# Patient Record
Sex: FEMALE | HISPANIC OR LATINO | URBAN - METROPOLITAN AREA
[De-identification: names, ages, dates, MRNs, and addresses within clinical notes are randomized per-mention and may not be internally consistent; named-entity substitution may affect disease eponyms.]

---

## 2020-07-28 ENCOUNTER — TRANSCRIBE ORDERS (OUTPATIENT)
Dept: ADMINISTRATIVE | Facility: HOSPITAL | Age: 45
End: 2020-07-28

## 2020-07-28 DIAGNOSIS — D50.9 IRON DEFICIENCY ANEMIA, UNSPECIFIED IRON DEFICIENCY ANEMIA TYPE: Primary | ICD-10-CM

## 2021-10-15 ENCOUNTER — OFFICE VISIT (OUTPATIENT)
Dept: DENTISTRY | Facility: CLINIC | Age: 46
End: 2021-10-15

## 2021-10-15 VITALS — SYSTOLIC BLOOD PRESSURE: 110 MMHG | HEART RATE: 73 BPM | DIASTOLIC BLOOD PRESSURE: 71 MMHG | TEMPERATURE: 98.3 F

## 2021-10-15 DIAGNOSIS — Z01.21 ENCOUNTER FOR DENTAL EXAMINATION AND CLEANING WITH ABNORMAL FINDINGS: Primary | ICD-10-CM

## 2021-10-15 PROCEDURE — D0210 INTRAORAL - COMPLETE SERIES OF RADIOGRAPHIC IMAGES: HCPCS | Performed by: DENTIST

## 2021-10-15 PROCEDURE — D0150 COMPREHENSIVE ORAL EVALUATION - NEW OR ESTABLISHED PATIENT: HCPCS | Performed by: DENTIST

## 2022-06-08 ENCOUNTER — OFFICE VISIT (OUTPATIENT)
Dept: DENTISTRY | Facility: CLINIC | Age: 47
End: 2022-06-08

## 2022-06-08 VITALS — DIASTOLIC BLOOD PRESSURE: 63 MMHG | HEART RATE: 61 BPM | SYSTOLIC BLOOD PRESSURE: 114 MMHG

## 2022-06-08 DIAGNOSIS — K08.3 RETAINED DENTAL ROOT: Primary | ICD-10-CM

## 2022-06-08 PROCEDURE — D7140 EXTRACTION, ERUPTED TOOTH OR EXPOSED ROOT (ELEVATION AND/OR FORCEPS REMOVAL): HCPCS | Performed by: DENTIST

## 2022-06-08 NOTE — PROGRESS NOTES
Oral Surgery    Sheela Avila presents for Ext #5    [address], patient denies any changes  Obtained a direct and personal consent  Risks and complications were explained  Pt agreed and consented  Consent scanned in doc center  Pre-Op BP WNL  Administered 1 carpule of 2 % Lidocaine w/ 1:100,000 epi via infiltration and 1/2 carpule 4% septocaine 1:100k epi infiltrations  Adequate anesthesia obtained, reflected gingiva, elevated, and extracted #5 without complications   Hemostasis achieved  Upon dismissal, patient received POI, gauze  Pt has analgesic at home  Recommended to return for the full debridement  Pt left satisfied and in good health  NV: full debridement    ABEBA Hartmann

## 2022-06-15 ENCOUNTER — OFFICE VISIT (OUTPATIENT)
Dept: DENTISTRY | Facility: CLINIC | Age: 47
End: 2022-06-15

## 2022-06-15 VITALS — DIASTOLIC BLOOD PRESSURE: 67 MMHG | TEMPERATURE: 97.9 F | HEART RATE: 64 BPM | SYSTOLIC BLOOD PRESSURE: 101 MMHG

## 2022-06-15 DIAGNOSIS — M27.3 DRY TOOTH SOCKET: ICD-10-CM

## 2022-06-15 DIAGNOSIS — K05.4 PERIODONTOSIS: Primary | ICD-10-CM

## 2022-06-15 PROCEDURE — D4355 FULL MOUTH DEBRIDEMENT TO ENABLE A COMPREHENSIVE ORAL EVALUATION AND DIAGNOSIS ON A SUBSEQUENT VISIT: HCPCS

## 2022-06-15 PROCEDURE — D0220 INTRAORAL - PERIAPICAL FIRST RADIOGRAPHIC IMAGE: HCPCS

## 2022-06-15 RX ORDER — AMOXICILLIN 500 MG/1
500 CAPSULE ORAL EVERY 8 HOURS SCHEDULED
Qty: 21 CAPSULE | Refills: 0 | Status: SHIPPED | OUTPATIENT
Start: 2022-06-15 | End: 2022-06-22

## 2022-06-15 NOTE — PROGRESS NOTES
perio debridement , 1 pa #5 area  REVIEWED MED HX: meds, allergies, health changes reviewed in EPIC  CHIEF CONCERN:  Pt reporting pain from #5 extraction few days ago  PAIN SCALE:  0  ASA CLASS:  I  PLAQUE:   moderate  CALCULUS: heavy accumulation   BLEEDING:   heavy  STAIN :  light   ORAL HYGIENE:   poor     Gross cavitron completed  Dr Lynsey Quinones evaluated UR area  Appears dry socket present  Dr Lynsey Quinones placed will RX antibiotic  1 carp Lidocaine 2%  Cleaned socket  Placed dry socket paste  Advised pt to bite on gauze 30 min and rx'd amoxicillin 500 mg  Visual and Tactile Intraoral/ Extraoral evaluation: Oral and Oropharyngeal cancer evaluation   No findings     REFERRALS: no referrals needed    Next Visit: appt with  for comp exam/ finalize tx plan/ fillings- evaluate for sc/rp    Last FMX: 10/15/21

## 2022-09-12 ENCOUNTER — OFFICE VISIT (OUTPATIENT)
Dept: DENTISTRY | Facility: CLINIC | Age: 47
End: 2022-09-12

## 2022-09-12 VITALS — SYSTOLIC BLOOD PRESSURE: 123 MMHG | HEART RATE: 73 BPM | DIASTOLIC BLOOD PRESSURE: 80 MMHG

## 2022-09-12 DIAGNOSIS — Z01.20 ENCOUNTER FOR DENTAL EXAMINATION AND CLEANING WITHOUT ABNORMAL FINDINGS: Primary | ICD-10-CM

## 2022-09-12 PROCEDURE — D0191 ASSESSMENT OF A PATIENT: HCPCS | Performed by: DENTIST

## 2022-09-12 NOTE — PROGRESS NOTES
Chang Mancera presents for treatment planning after debridment  Verbal consent for treatment given in addition to the forms   used  Reviewed health history - Patient is ASA I  Consents signed: Yes    Perio: probing completed, prophy reccommended   Pain Scale: 3, some sensitivity  Caries Assessment: medium  Radiographs: none taken today, previous fmx from 10/21     Oral Hygiene instruction reviewed and given  Hygiene recall visits recommended to the patient  EO: wnl  IO: CL 2 occlusion, OB 90%, OJ 9mm  Caries: #4-MO, #13-MOD, #15-O, #17-O, #18-O, #19-OB, #20-O    Treatment Plan:  1  Periodontal therapy: prophy  2  Caries control  3  Occlusal evaluation: Discussed implants for missing teeth in area of #3,5,12,14  Implant consult to be done at [facility] with dr lau     Prognosis is Good      Next visit: prophy  NVV: implant consult [facility] with dr Ayleen Rahman

## 2022-12-01 ENCOUNTER — OFFICE VISIT (OUTPATIENT)
Dept: DENTISTRY | Facility: CLINIC | Age: 47
End: 2022-12-01

## 2022-12-01 VITALS — DIASTOLIC BLOOD PRESSURE: 60 MMHG | TEMPERATURE: 95.8 F | SYSTOLIC BLOOD PRESSURE: 115 MMHG | HEART RATE: 65 BPM

## 2022-12-01 DIAGNOSIS — Z01.20 ENCOUNTER FOR DENTAL EXAMINATION AND CLEANING WITHOUT ABNORMAL FINDINGS: Primary | ICD-10-CM

## 2022-12-01 NOTE — PROGRESS NOTES
Patient examined today for implant consult, upon exam patient has 100% deep bite and no interocclusal space for crowns  Patient would need ortho first to align teeth and create space before implants could be discussed  Referral given to patient for ortho       NV: nelly at [facility]

## 2023-02-07 DIAGNOSIS — Z78.9 NEED FOR FOLLOW-UP BY SOCIAL WORKER: Primary | ICD-10-CM

## 2023-02-08 ENCOUNTER — PATIENT OUTREACH (OUTPATIENT)
Dept: FAMILY MEDICINE CLINIC | Facility: CLINIC | Age: 48
End: 2023-02-08
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
## 2023-04-06 ENCOUNTER — PATIENT OUTREACH (OUTPATIENT)
Dept: PEDIATRICS CLINIC | Facility: CLINIC | Age: 48
End: 2023-04-06
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
## 2023-05-27 ENCOUNTER — HOSPITAL ENCOUNTER (EMERGENCY)
Facility: HOSPITAL | Age: 48
Discharge: HOME | End: 2023-05-27
Attending: EMERGENCY MEDICINE | Admitting: EMERGENCY MEDICINE

## 2023-05-27 VITALS
TEMPERATURE: 97.6 F | OXYGEN SATURATION: 99 % | RESPIRATION RATE: 16 BRPM | SYSTOLIC BLOOD PRESSURE: 116 MMHG | HEART RATE: 74 BPM | DIASTOLIC BLOOD PRESSURE: 54 MMHG

## 2023-05-27 DIAGNOSIS — R10.9 ABDOMINAL PAIN, UNSPECIFIED ABDOMINAL LOCATION: Primary | ICD-10-CM

## 2023-05-27 LAB
ALBUMIN SERPL BCP-MCNC: 3.8 G/DL (ref 3.5–5)
ALP SERPL-CCNC: 194 U/L (ref 34–104)
ALT SERPL W P-5'-P-CCNC: 19 U/L (ref 7–52)
ANION GAP SERPL CALCULATED.3IONS-SCNC: 9 MMOL/L (ref 4–13)
AST SERPL W P-5'-P-CCNC: 13 U/L (ref 13–39)
BASOPHILS # BLD AUTO: 0.01 THOUSANDS/ÂΜL (ref 0–0.1)
BASOPHILS NFR BLD AUTO: 0 % (ref 0–1)
BILIRUB SERPL-MCNC: 0.69 MG/DL (ref 0.2–1)
BUN SERPL-MCNC: 15 MG/DL (ref 5–25)
CALCIUM SERPL-MCNC: 9.1 MG/DL (ref 8.4–10.2)
CHLORIDE SERPL-SCNC: 102 MMOL/L (ref 96–108)
CO2 SERPL-SCNC: 24 MMOL/L (ref 21–32)
CREAT SERPL-MCNC: 0.54 MG/DL (ref 0.6–1.3)
EOSINOPHIL # BLD AUTO: 0.14 THOUSAND/ÂΜL (ref 0–0.61)
EOSINOPHIL NFR BLD AUTO: 3 % (ref 0–6)
ERYTHROCYTE [DISTWIDTH] IN BLOOD BY AUTOMATED COUNT: 12.2 % (ref 11.6–15.1)
EXT PREGNANCY TEST URINE: NEGATIVE
EXT. CONTROL: NORMAL
GFR SERPL CREATININE-BSD FRML MDRD: 111 ML/MIN/1.73SQ M
GLUCOSE SERPL-MCNC: 323 MG/DL (ref 65–140)
HCT VFR BLD AUTO: 40.3 % (ref 34.8–46.1)
HGB BLD-MCNC: 14.3 G/DL (ref 11.5–15.4)
IMM GRANULOCYTES # BLD AUTO: 0.01 THOUSAND/UL (ref 0–0.2)
IMM GRANULOCYTES NFR BLD AUTO: 0 % (ref 0–2)
LIPASE SERPL-CCNC: 43 U/L (ref 11–82)
LYMPHOCYTES # BLD AUTO: 1.59 THOUSANDS/ÂΜL (ref 0.6–4.47)
LYMPHOCYTES NFR BLD AUTO: 33 % (ref 14–44)
MCH RBC QN AUTO: 30 PG (ref 26.8–34.3)
MCHC RBC AUTO-ENTMCNC: 35.5 G/DL (ref 31.4–37.4)
MCV RBC AUTO: 85 FL (ref 82–98)
MONOCYTES # BLD AUTO: 0.39 THOUSAND/ÂΜL (ref 0.17–1.22)
MONOCYTES NFR BLD AUTO: 8 % (ref 4–12)
NEUTROPHILS # BLD AUTO: 2.62 THOUSANDS/ÂΜL (ref 1.85–7.62)
NEUTS SEG NFR BLD AUTO: 56 % (ref 43–75)
NRBC BLD AUTO-RTO: 0 /100 WBCS
PLATELET # BLD AUTO: 261 THOUSANDS/UL (ref 149–390)
PMV BLD AUTO: 9.9 FL (ref 8.9–12.7)
POTASSIUM SERPL-SCNC: 3.9 MMOL/L (ref 3.5–5.3)
PROT SERPL-MCNC: 6.5 G/DL (ref 6.4–8.4)
RBC # BLD AUTO: 4.77 MILLION/UL (ref 3.81–5.12)
SODIUM SERPL-SCNC: 135 MMOL/L (ref 135–147)
WBC # BLD AUTO: 4.76 THOUSAND/UL (ref 4.31–10.16)

## 2023-05-27 RX ORDER — MAGNESIUM HYDROXIDE/ALUMINUM HYDROXICE/SIMETHICONE 120; 1200; 1200 MG/30ML; MG/30ML; MG/30ML
30 SUSPENSION ORAL ONCE
Status: COMPLETED | OUTPATIENT
Start: 2023-05-27 | End: 2023-05-27

## 2023-05-27 RX ORDER — FAMOTIDINE 20 MG/1
20 TABLET, FILM COATED ORAL ONCE
Status: COMPLETED | OUTPATIENT
Start: 2023-05-27 | End: 2023-05-27

## 2023-05-27 RX ORDER — LANSOPRAZOLE 30 MG/1
30 CAPSULE, DELAYED RELEASE ORAL DAILY
Qty: 14 CAPSULE | Refills: 0 | Status: SHIPPED | OUTPATIENT
Start: 2023-05-27 | End: 2023-06-10

## 2023-05-27 RX ORDER — FAMOTIDINE 20 MG/1
20 TABLET, FILM COATED ORAL 2 TIMES DAILY
Qty: 30 TABLET | Refills: 0 | Status: SHIPPED | OUTPATIENT
Start: 2023-05-27

## 2023-05-27 RX ADMIN — ALUMINUM HYDROXIDE, MAGNESIUM HYDROXIDE, AND SIMETHICONE 30 ML: 200; 200; 20 SUSPENSION ORAL at 22:16

## 2023-05-27 RX ADMIN — FAMOTIDINE 20 MG: 20 TABLET, FILM COATED ORAL at 22:15

## 2023-05-28 NOTE — ED PROVIDER NOTES
History  Chief Complaint   Patient presents with   • Abdominal Pain     Patient arrives to the emergency department with complain of abdominal pain starting two weeks ago  The pain is periumbilical and radiates to the right flank  She states the pain starts when she get hungry  She states that she had been taking naproxen 4 times a day for the pain, however she has not taken her medication since Thursday due to a family crisis  69-year-old female, presents with abdominal pain  Reports pain in periumbilical and upper abdomen that has been present for 2 weeks  Patient states pain is worse when she gets hungry before eating  Has had several episodes of nausea and vomiting, last several days ago  Denies any fever, denies any history of abdominal surgery  Patient states that she has been taking naproxen frequently for pain  History provided by:  Patient   used: Yes    Abdominal Pain  Associated symptoms: nausea    Associated symptoms: no fever        Prior to Admission Medications   Prescriptions Last Dose Informant Patient Reported? Taking?   acetaminophen (TYLENOL) 500 mg tablet   No No   Sig: Take 2 tablets (1,000 mg total) by mouth every 6 (six) hours as needed for mild pain for up to 20 doses   fluticasone (FLONASE) 50 mcg/act nasal spray   No No   Si sprays into each nostril daily   naproxen (Naprosyn) 500 mg tablet   No No   Sig: Take 1 tablet (500 mg total) by mouth 2 (two) times a day with meals      Facility-Administered Medications: None       Past Medical History:   Diagnosis Date   • Diabetes mellitus ([facility] [address] )        History reviewed  No pertinent surgical history  History reviewed  No pertinent family history  I have reviewed and agree with the history as documented      E-Cigarette/Vaping   • E-Cigarette Use Never User      E-Cigarette/Vaping Substances     Social History     Tobacco Use   • Smoking status: Never   • Smokeless tobacco: Never   Vaping Use   • Vaping Use: Never used   Substance Use Topics   • Alcohol use: Yes     Comment: socially   • Drug use: Never       Review of Systems   Constitutional: Negative  Negative for fever  Respiratory: Negative  Cardiovascular: Negative  Gastrointestinal: Positive for abdominal pain and nausea  Neurological: Negative  Physical Exam  Physical Exam  Vitals and nursing note reviewed  Constitutional:       General: She is not in acute distress  HENT:      Head: Normocephalic and atraumatic  Cardiovascular:      Rate and Rhythm: Normal rate and regular rhythm  Pulmonary:      Effort: Pulmonary effort is normal       Breath sounds: Normal breath sounds  Abdominal:      Comments: Nondistended, soft  Mild tenderness to epigastric abdomen, no rebound or guarding  No right upper quadrant tenderness, negative Fernandez sign, no right lower quadrant tenderness  Skin:     General: Skin is warm and dry  Neurological:      General: No focal deficit present  Mental Status: She is alert and oriented to person, place, and time           Vital Signs  ED Triage Vitals [05/27/23 2158]   Temperature Pulse Respirations Blood Pressure SpO2   97 6 °F (36 4 °C) 74 16 116/54 99 %      Temp Source Heart Rate Source Patient Position - Orthostatic VS BP Location FiO2 (%)   Oral Monitor Lying Right arm --      Pain Score       --           Vitals:    05/27/23 2158   BP: 116/54   Pulse: 74   Patient Position - Orthostatic VS: Lying         Visual Acuity      ED Medications  Medications   aluminum-magnesium hydroxide-simethicone (MYLANTA) oral suspension 30 mL (30 mL Oral Given 5/27/23 2216)   famotidine (PEPCID) tablet 20 mg (20 mg Oral Given 5/27/23 2215)       Diagnostic Studies  Results Reviewed     Procedure Component Value Units Date/Time    Comprehensive metabolic panel [[id number]]  (Abnormal) Collected: 05/27/23 2223    Lab Status: Final result Specimen: Blood from Arm, Right Updated: 05/27/23 2249     Sodium 135 mmol/L Potassium 3 9 mmol/L      Chloride 102 mmol/L      CO2 24 mmol/L      ANION GAP 9 mmol/L      BUN 15 mg/dL      Creatinine 0 54 mg/dL      Glucose 323 mg/dL      Calcium 9 1 mg/dL      AST 13 U/L      ALT 19 U/L      Alkaline Phosphatase 194 U/L      Total Protein 6 5 g/dL      Albumin 3 8 g/dL      Total Bilirubin 0 69 mg/dL      eGFR 111 ml/min/1 73sq m     Narrative:      National Kidney Disease Foundation guidelines for Chronic Kidney Disease (CKD):   •  Stage 1 with normal or high GFR (GFR > 90 mL/min/1 73 square meters)  •  Stage 2 Mild CKD (GFR = 60-89 mL/min/1 73 square meters)  •  Stage 3A Moderate CKD (GFR = 45-59 mL/min/1 73 square meters)  •  Stage 3B Moderate CKD (GFR = 30-44 mL/min/1 73 square meters)  •  Stage 4 Severe CKD (GFR = 15-29 mL/min/1 73 square meters)  •  Stage 5 End Stage CKD (GFR <15 mL/min/1 73 square meters)  Note: GFR calculation is accurate only with a steady state creatinine    Lipase [[id number]]  (Normal) Collected: 05/27/23 2223    Lab Status: Final result Specimen: Blood from Arm, Right Updated: 05/27/23 2249     Lipase 43 u/L     CBC and differential [[id number]] Collected: 05/27/23 2223    Lab Status: Final result Specimen: Blood from Arm, Right Updated: 05/27/23 2230     WBC 4 76 Thousand/uL      RBC 4 77 Million/uL      Hemoglobin 14 3 g/dL      Hematocrit 40 3 %      MCV 85 fL      MCH 30 0 pg      MCHC 35 5 g/dL      RDW 12 2 %      MPV 9 9 fL      Platelets 050 Thousands/uL      nRBC 0 /100 WBCs      Neutrophils Relative 56 %      Immat GRANS % 0 %      Lymphocytes Relative 33 %      Monocytes Relative 8 %      Eosinophils Relative 3 %      Basophils Relative 0 %      Neutrophils Absolute 2 62 Thousands/µL      Immature Grans Absolute 0 01 Thousand/uL      Lymphocytes Absolute 1 59 Thousands/µL      Monocytes Absolute 0 39 Thousand/µL      Eosinophils Absolute 0 14 Thousand/µL      Basophils Absolute 0 01 Thousands/µL     POCT pregnancy, urine [[id number]]  (Normal) Resulted: 05/27/23 2210    Lab Status: Final result Updated: 05/27/23 2216     EXT Preg Test, Ur Negative     Control Valid                 No orders to display              Procedures  Procedures         ED Course  ED Course as of 05/27/23 2308   Sat May 27, 2023   2305 Patient comfortable, reports improvement in pain after receiving medication, tolerating oral intake in ED  Abdomen soft and nontender on repeat exam   Lab results reviewed and discussed with patient  Glucose noted to be elevated, patient has history of diabetes, no other acute abnormalities  2307 Discussed with patient through  to stop taking any anti-inflammatory medication as this is likely contributing to her abdominal pain  We will start patient on antacid H2 blocker and PPI  Discussed need to follow-up with primary doctor and GI for further evaluation, follow-up or return for any worsening or new concerning symptoms  SBIRT 20yo+    Flowsheet Row Most Recent Value   Initial Alcohol Screen: US AUDIT-C     1  How often do you have a drink containing alcohol? 0 Filed at: 05/27/2023 2202   2  How many drinks containing alcohol do you have on a typical day you are drinking? 0 Filed at: 05/27/2023 2202   3b  FEMALE Any Age, or MALE 65+: How often do you have 4 or more drinks on one occassion? 0 Filed at: 05/27/2023 2202   Audit-C Score 0 Filed at: 05/27/2023 2202   ANDREZ: How many times in the past year have you    Used an illegal drug or used a prescription medication for non-medical reasons? Never Filed at: 05/27/2023 2202                    Medical Decision Making  26-year-old female, presenting with abdominal pain  Differential diagnosis includes gastritis, pancreatitis, cholecystitis, appendicitis among other diagnoses  On exam, patient looks well, has mild epigastric tenderness, no right upper quadrant or right lower quadrant tenderness  Patient reports taking multiple doses of naproxen daily  Labs ordered, will give medication, monitor and reevaluate  I have reviewed test results and diagnosis with patient  Follow-up plan reviewed  Precautions for acute return for re-evaluation are reviewed  Opportunity to ask questions was provided  Patient verbalizes understanding  Amount and/or Complexity of Data Reviewed  Labs: ordered  Decision-making details documented in ED Course  Risk  OTC drugs  Prescription drug management  Disposition  Final diagnoses:   Abdominal pain, unspecified abdominal location     Time reflects when diagnosis was documented in both MDM as applicable and the Disposition within this note     Time User Action Codes Description Comment    5/27/2023 11:03 PM Edgarnikki OrtizMisa [address]  [R10 9] Abdominal pain, unspecified abdominal location       ED Disposition     ED Disposition   Discharge    Condition   Stable    Date/Time   Sat May 27, 2023 11:03 PM    [address] discharge to home/self care  Follow-up Information     Follow up With Specialties Details Why Contact Info Additional [address] Danuta Martel, DO Internal Medicine        [facility]   [address] [place] [address] [phone]  Radha Ventura [address] [facility], [address] [place] [address], [address], [place], [place], [id number] [phone]          Patient's Medications   Discharge Prescriptions    FAMOTIDINE (PEPCID) 20 MG TABLET    Take 1 tablet (20 mg total) by mouth 2 (two) times a day       Start Date: 5/27/2023 End Date: --       Order Dose: 20 mg       Quantity: 30 tablet    Refills: 0    LANSOPRAZOLE (PREVACID) 30 MG CAPSULE    Take 1 capsule (30 mg total) by mouth daily for 14 days       Start Date: 5/27/2023 End Date: 6/10/2023       Order Dose: 30 mg       Quantity: 14 capsule    Refills: 0       No discharge procedures on file      PDMP Review     None          ED Provider  Electronically Signed by           Shaan Rios MD  05/27/23 3978

## 2023-05-28 NOTE — ED NOTES
Discharge reviewed with patient  Patient verbalized understanding and has no further questions at this time  Patient ambulatory off unit with steady gait        Devin Choe, [address] [facility]  05/27/23 5814

## 2023-05-28 NOTE — DISCHARGE INSTRUCTIONS
As discussed, do not take any anti-inflammatory medication including naproxen, ibuprofen, Aleve, Advil, or Motrin

## 2023-06-07 ENCOUNTER — OFFICE VISIT (OUTPATIENT)
Dept: DENTISTRY | Facility: CLINIC | Age: 48
End: 2023-06-07

## 2023-06-07 VITALS — HEART RATE: 97 BPM | DIASTOLIC BLOOD PRESSURE: 76 MMHG | SYSTOLIC BLOOD PRESSURE: 123 MMHG

## 2023-06-07 DIAGNOSIS — K02.9 CARIES: Primary | ICD-10-CM

## 2023-06-07 PROCEDURE — D2391 RESIN-BASED COMPOSITE - 1 SURFACE, POSTERIOR: HCPCS

## 2023-06-07 PROCEDURE — D2393 RESIN-BASED COMPOSITE - 3 SURFACES, POSTERIOR: HCPCS

## 2023-06-07 NOTE — PROGRESS NOTES
Pt presents to the clinic for a composite restoration on #13 and 20  H&P updated and vitals recorded  ASA: II   Pain: 0/10     Oral Cancer Screening Completed  WNL      20% Benzocaine topical LA applied to the injection site  Administered 1 5 carp 4% articaine 1:100k epi via buccal and lingual infiltration  Discussed with patient need for RCT if pulp exposure occurs or in future if pulp is inflamed  Pt understands and consents  Prepped teeth #13 and 20 with 245 carbide on high speed  Caries/ Existing restoration removed with round carbide on slow speed  Isolation with cotton rolls and dri-angles  Selectively etched enamel with 37% H2PO4, rinse, dry  Applied Adhese with 20 second scrub once, gentle air dry and light cured for 10s  Restored with flowable and Tetric bulk ciara shade A3 n light-cured increments  Refined with finishing burs, polished with enhance point  Verified occlusion and contacts  Pt left satisfied  Post op instructions given       NV: resins

## 2024-04-26 ENCOUNTER — HOSPITAL ENCOUNTER (EMERGENCY)
Facility: HOSPITAL | Age: 49
Discharge: HOME | End: 2024-04-27
Attending: EMERGENCY MEDICINE

## 2024-04-26 VITALS
SYSTOLIC BLOOD PRESSURE: 130 MMHG | DIASTOLIC BLOOD PRESSURE: 82 MMHG | TEMPERATURE: 98.4 F | BODY MASS INDEX: 31.41 KG/M2 | RESPIRATION RATE: 18 BRPM | WEIGHT: 167.33 LBS | OXYGEN SATURATION: 99 % | HEART RATE: 78 BPM

## 2024-04-26 DIAGNOSIS — Z87.19 HISTORY OF BLOODY STOOLS: ICD-10-CM

## 2024-04-26 DIAGNOSIS — N39.0 ACUTE UTI: ICD-10-CM

## 2024-04-26 DIAGNOSIS — R73.9 HYPERGLYCEMIA: ICD-10-CM

## 2024-04-26 DIAGNOSIS — B37.2 YEAST INFECTION OF THE SKIN: ICD-10-CM

## 2024-04-26 PROCEDURE — 87186 SC STD MICRODIL/AGAR DIL: CPT | Performed by: EMERGENCY MEDICINE

## 2024-04-26 PROCEDURE — 99283 EMERGENCY DEPT VISIT LOW MDM: CPT

## 2024-04-26 PROCEDURE — 81001 URINALYSIS AUTO W/SCOPE: CPT | Performed by: EMERGENCY MEDICINE

## 2024-04-26 PROCEDURE — 87086 URINE CULTURE/COLONY COUNT: CPT | Performed by: EMERGENCY MEDICINE

## 2024-04-26 PROCEDURE — 81025 URINE PREGNANCY TEST: CPT | Performed by: EMERGENCY MEDICINE

## 2024-04-26 PROCEDURE — 87077 CULTURE AEROBIC IDENTIFY: CPT | Performed by: EMERGENCY MEDICINE

## 2024-04-27 LAB
ALBUMIN SERPL BCP-MCNC: 4.1 G/DL (ref 3.5–5)
ALP SERPL-CCNC: 278 U/L (ref 34–104)
ALT SERPL W P-5'-P-CCNC: 24 U/L (ref 7–52)
ANION GAP SERPL CALCULATED.3IONS-SCNC: 10 MMOL/L (ref 4–13)
APTT PPP: 27 SECONDS (ref 23–37)
AST SERPL W P-5'-P-CCNC: 15 U/L (ref 13–39)
BACTERIA UR QL AUTO: ABNORMAL /HPF
BASOPHILS # BLD AUTO: 0.01 THOUSANDS/ÂΜL (ref 0–0.1)
BASOPHILS NFR BLD AUTO: 0 % (ref 0–1)
BILIRUB SERPL-MCNC: 0.75 MG/DL (ref 0.2–1)
BILIRUB UR QL STRIP: NEGATIVE
BUN SERPL-MCNC: 15 MG/DL (ref 5–25)
CALCIUM SERPL-MCNC: 9.5 MG/DL (ref 8.4–10.2)
CHLORIDE SERPL-SCNC: 99 MMOL/L (ref 96–108)
CLARITY UR: ABNORMAL
CO2 SERPL-SCNC: 25 MMOL/L (ref 21–32)
COLOR UR: YELLOW
CREAT SERPL-MCNC: 0.57 MG/DL (ref 0.6–1.3)
EOSINOPHIL # BLD AUTO: 0.09 THOUSAND/ÂΜL (ref 0–0.61)
EOSINOPHIL NFR BLD AUTO: 1 % (ref 0–6)
ERYTHROCYTE [DISTWIDTH] IN BLOOD BY AUTOMATED COUNT: 12 % (ref 11.6–15.1)
EXT PREGNANCY TEST URINE: NEGATIVE
EXT. CONTROL: NORMAL
GFR SERPL CREATININE-BSD FRML MDRD: 108 ML/MIN/1.73SQ M
GLUCOSE SERPL-MCNC: 370 MG/DL (ref 65–140)
GLUCOSE UR STRIP-MCNC: ABNORMAL MG/DL
HCT VFR BLD AUTO: 40.7 % (ref 34.8–46.1)
HEMOCCULT STL QL IA: POSITIVE
HGB BLD-MCNC: 14.5 G/DL (ref 11.5–15.4)
HGB UR QL STRIP.AUTO: ABNORMAL
IMM GRANULOCYTES # BLD AUTO: 0.02 THOUSAND/UL (ref 0–0.2)
IMM GRANULOCYTES NFR BLD AUTO: 0 % (ref 0–2)
INR PPP: 0.87 (ref 0.84–1.19)
KETONES UR STRIP-MCNC: NEGATIVE MG/DL
LEUKOCYTE ESTERASE UR QL STRIP: ABNORMAL
LIPASE SERPL-CCNC: 36 U/L (ref 11–82)
LYMPHOCYTES # BLD AUTO: 1.75 THOUSANDS/ÂΜL (ref 0.6–4.47)
LYMPHOCYTES NFR BLD AUTO: 27 % (ref 14–44)
MAGNESIUM SERPL-MCNC: 1.9 MG/DL (ref 1.9–2.7)
MCH RBC QN AUTO: 30 PG (ref 26.8–34.3)
MCHC RBC AUTO-ENTMCNC: 35.6 G/DL (ref 31.4–37.4)
MCV RBC AUTO: 84 FL (ref 82–98)
MONOCYTES # BLD AUTO: 0.49 THOUSAND/ÂΜL (ref 0.17–1.22)
MONOCYTES NFR BLD AUTO: 7 % (ref 4–12)
NEUTROPHILS # BLD AUTO: 4.23 THOUSANDS/ÂΜL (ref 1.85–7.62)
NEUTS SEG NFR BLD AUTO: 65 % (ref 43–75)
NITRITE UR QL STRIP: NEGATIVE
NON-SQ EPI CELLS URNS QL MICRO: ABNORMAL /HPF
NRBC BLD AUTO-RTO: 0 /100 WBCS
PH UR STRIP.AUTO: 6 [PH]
PLATELET # BLD AUTO: 234 THOUSANDS/UL (ref 149–390)
PMV BLD AUTO: 10.6 FL (ref 8.9–12.7)
POTASSIUM SERPL-SCNC: 3.6 MMOL/L (ref 3.5–5.3)
PROT SERPL-MCNC: 7.4 G/DL (ref 6.4–8.4)
PROT UR STRIP-MCNC: ABNORMAL MG/DL
PROTHROMBIN TIME: 11.8 SECONDS (ref 11.6–14.5)
RBC # BLD AUTO: 4.83 MILLION/UL (ref 3.81–5.12)
RBC #/AREA URNS AUTO: ABNORMAL /HPF
SODIUM SERPL-SCNC: 134 MMOL/L (ref 135–147)
SP GR UR STRIP.AUTO: 1.01 (ref 1–1.03)
UROBILINOGEN UR QL STRIP.AUTO: 0.2 E.U./DL
WBC # BLD AUTO: 6.59 THOUSAND/UL (ref 4.31–10.16)
WBC #/AREA URNS AUTO: ABNORMAL /HPF

## 2024-04-27 PROCEDURE — 85730 THROMBOPLASTIN TIME PARTIAL: CPT | Performed by: EMERGENCY MEDICINE

## 2024-04-27 PROCEDURE — G0328 FECAL BLOOD SCRN IMMUNOASSAY: HCPCS | Performed by: EMERGENCY MEDICINE

## 2024-04-27 PROCEDURE — 83690 ASSAY OF LIPASE: CPT | Performed by: EMERGENCY MEDICINE

## 2024-04-27 PROCEDURE — 83735 ASSAY OF MAGNESIUM: CPT | Performed by: EMERGENCY MEDICINE

## 2024-04-27 PROCEDURE — 36415 COLL VENOUS BLD VENIPUNCTURE: CPT | Performed by: EMERGENCY MEDICINE

## 2024-04-27 PROCEDURE — 96360 HYDRATION IV INFUSION INIT: CPT

## 2024-04-27 PROCEDURE — 80053 COMPREHEN METABOLIC PANEL: CPT | Performed by: EMERGENCY MEDICINE

## 2024-04-27 PROCEDURE — 85025 COMPLETE CBC W/AUTO DIFF WBC: CPT | Performed by: EMERGENCY MEDICINE

## 2024-04-27 PROCEDURE — 99284 EMERGENCY DEPT VISIT MOD MDM: CPT | Performed by: EMERGENCY MEDICINE

## 2024-04-27 PROCEDURE — 85610 PROTHROMBIN TIME: CPT | Performed by: EMERGENCY MEDICINE

## 2024-04-27 RX ORDER — NITROFURANTOIN 25; 75 MG/1; MG/1
100 CAPSULE ORAL 2 TIMES DAILY
Qty: 9 CAPSULE | Refills: 0 | Status: SHIPPED | OUTPATIENT
Start: 2024-04-27 | End: 2024-05-03 | Stop reason: ALTCHOICE

## 2024-04-27 RX ORDER — IODINE/SODIUM IODIDE 2 %
TINCTURE TOPICAL 4 TIMES DAILY
Status: CANCELLED | OUTPATIENT
Start: 2024-04-27

## 2024-04-27 RX ORDER — ESTRADIOL 0.1 MG/G
1 CREAM VAGINAL
Status: CANCELLED | OUTPATIENT
Start: 2024-04-27

## 2024-04-27 RX ORDER — CLOTRIMAZOLE 1 %
CREAM (GRAM) TOPICAL
Qty: 15 G | Refills: 0 | Status: SHIPPED | OUTPATIENT
Start: 2024-04-27 | End: 2024-05-03 | Stop reason: ALTCHOICE

## 2024-04-27 RX ORDER — CLOTRIMAZOLE AND BETAMETHASONE DIPROPIONATE 10; .64 MG/G; MG/G
CREAM TOPICAL 2 TIMES DAILY
Status: DISCONTINUED | OUTPATIENT
Start: 2024-04-27 | End: 2024-04-27 | Stop reason: HOSPADM

## 2024-04-27 RX ORDER — NITROFURANTOIN 25; 75 MG/1; MG/1
100 CAPSULE ORAL ONCE
Status: COMPLETED | OUTPATIENT
Start: 2024-04-27 | End: 2024-04-27

## 2024-04-27 RX ORDER — ACETAMINOPHEN 325 MG/1
650 TABLET ORAL ONCE
Status: CANCELLED | OUTPATIENT
Start: 2024-04-27 | End: 2024-04-27

## 2024-04-27 RX ADMIN — SODIUM CHLORIDE 1000 ML: 0.9 INJECTION, SOLUTION INTRAVENOUS at 00:49

## 2024-04-27 RX ADMIN — NITROFURANTOIN (MONOHYDRATE/MACROCRYSTALS) 100 MG: 75; 25 CAPSULE ORAL at 00:54

## 2024-04-27 NOTE — ED PROVIDER NOTES
History  Chief Complaint   Patient presents with    Possible UTI     Pt presents to the ED c/o pain and burning with urination. Pt states that she noticed some blood in her urine also     45 y.o.  with PMH hysteroscopic resection of cervical fibroid, diabetes comes to the ED for the evaluation of lower abdominal pain, burning while urination,pain with bowel movements and itching of vagina.   On my examination, inflammation noted all along the Vaginal epithelium extending to the gluteal folds.   Differential diagnoses:  Atrophic vaginitis, Vulvovaginitis, Diverticulitis, perianal fistula, Hemorrhoids and UTI     Plan:  UA and UC: Positive for WBC and Bacteria - Macrobid 100 mg BID.  Take 1 capsule (100 mg total) by mouth 2 (two) times a day for 5 days  Fecal occult blood test - positive.   - not on any medications. Ordered metformin at the time of discharge.   Topical Clotrimazole and Betamethasone cream.   Follow up with PCP and GI in one week                 Prior to Admission Medications   Prescriptions Last Dose Informant Patient Reported? Taking?   acetaminophen (TYLENOL) 500 mg tablet   No No   Sig: Take 2 tablets (1,000 mg total) by mouth every 6 (six) hours as needed for mild pain for up to 20 doses   famotidine (PEPCID) 20 mg tablet   No No   Sig: Take 1 tablet (20 mg total) by mouth 2 (two) times a day   fluticasone (FLONASE) 50 mcg/act nasal spray   No No   Si sprays into each nostril daily   lansoprazole (PREVACID) 30 mg capsule   No No   Sig: Take 1 capsule (30 mg total) by mouth daily for 14 days   naproxen (Naprosyn) 500 mg tablet   No No   Sig: Take 1 tablet (500 mg total) by mouth 2 (two) times a day with meals      Facility-Administered Medications: None       Past Medical History:   Diagnosis Date    Diabetes mellitus (HCC)        History reviewed. No pertinent surgical history.    History reviewed. No pertinent family history.  I have reviewed and agree with the history as  documented.    E-Cigarette/Vaping    E-Cigarette Use Never User      E-Cigarette/Vaping Substances     Social History     Tobacco Use    Smoking status: Never    Smokeless tobacco: Never   Vaping Use    Vaping status: Never Used   Substance Use Topics    Alcohol use: Yes     Comment: socially    Drug use: Never        Review of Systems   Gastrointestinal:  Positive for constipation.   Genitourinary:  Positive for difficulty urinating, frequency and vaginal pain.   Neurological: Negative.    Hematological: Negative.    Psychiatric/Behavioral: Negative.         Physical Exam  ED Triage Vitals [04/26/24 2344]   Temperature Pulse Respirations Blood Pressure SpO2   98.4 °F (36.9 °C) 78 18 130/82 99 %      Temp Source Heart Rate Source Patient Position - Orthostatic VS BP Location FiO2 (%)   Oral Monitor Sitting Right arm --      Pain Score       --             Orthostatic Vital Signs  Vitals:    04/26/24 2344   BP: 130/82   Pulse: 78   Patient Position - Orthostatic VS: Sitting       Physical Exam  HENT:      Head: Normocephalic.      Nose: Nose normal.   Eyes:      Extraocular Movements: Extraocular movements intact.      Conjunctiva/sclera: Conjunctivae normal.      Pupils: Pupils are equal, round, and reactive to light.   Cardiovascular:      Rate and Rhythm: Normal rate.   Pulmonary:      Effort: Pulmonary effort is normal.   Abdominal:      General: Bowel sounds are normal.   Genitourinary:     Comments: Inflammation extending from vagina to gluteal folds.   Musculoskeletal:         General: Normal range of motion.      Cervical back: Normal range of motion.   Neurological:      Mental Status: Mental status is at baseline.         ED Medications  Medications   clotrimazole-betamethasone (LOTRISONE) 1-0.05 % cream (has no administration in time range)   sodium chloride 0.9 % bolus 1,000 mL (1,000 mL Intravenous New Bag 4/27/24 0049)   nitrofurantoin (MACROBID) extended-release capsule 100 mg (100 mg Oral Given  4/27/24 0054)       Diagnostic Studies  Results Reviewed       Procedure Component Value Units Date/Time    Comprehensive metabolic panel [[id number]]  (Abnormal) Collected: 04/27/24 0046    Lab Status: Final result Specimen: Blood from Arm, Left Updated: 04/27/24 0110     Sodium 134 mmol/L      Potassium 3.6 mmol/L      Chloride 99 mmol/L      CO2 25 mmol/L      ANION GAP 10 mmol/L      BUN 15 mg/dL      Creatinine 0.57 mg/dL      Glucose 370 mg/dL      Calcium 9.5 mg/dL      AST 15 U/L      ALT 24 U/L      Alkaline Phosphatase 278 U/L      Total Protein 7.4 g/dL      Albumin 4.1 g/dL      Total Bilirubin 0.75 mg/dL      eGFR 108 ml/min/1.73sq m     Narrative:      National Kid[employer] Foundation guidelines for Chronic Kidney Disease (CKD):     Stage 1 with normal or high GFR (GFR > 90 mL/min/1.73 square meters)    Stage 2 Mild CKD (GFR = 60-89 mL/min/1.73 square meters)    Stage 3A Moderate CKD (GFR = 45-59 mL/min/1.73 square meters)    Stage 3B Moderate CKD (GFR = 30-44 mL/min/1.73 square meters)    Stage 4 Severe CKD (GFR = 15-29 mL/min/1.73 square meters)    Stage 5 End Stage CKD (GFR <15 mL/min/1.73 square meters)  Note: GFR calculation is accurate only with a steady state creatinine    Magnesium [[id number]]  (Normal) Collected: 04/27/24 0046    Lab Status: Final result Specimen: Blood from Arm, Left Updated: 04/27/24 0110     Magnesium 1.9 mg/dL     Lipase [[id number]]  (Normal) Collected: 04/27/24 0046    Lab Status: Final result Specimen: Blood from Arm, Left Updated: 04/27/24 0110     Lipase 36 u/L     APTT [[id number]]  (Normal) Collected: 04/27/24 0046    Lab Status: Final result Specimen: Blood from Arm, Left Updated: 04/27/24 0103     PTT 27 seconds     Protime-INR [[id number]]  (Normal) Collected: 04/27/24 0046    Lab Status: Final result Specimen: Blood from Arm, Left Updated: 04/27/24 0103     Protime 11.8 seconds      INR 0.87    Occult Blood, Fecal Immunochemical [[id number]]  (Abnormal) Collected:  04/27/24 0046    Lab Status: Final result Specimen: Stool from Per Rectum Updated: 04/27/24 0057     OCCULT BLD, FECAL IMMUNOLOGICAL Positive    Narrative:        Performed by Fecal Immunochemical Test.    CBC and differential [[id number]] Collected: 04/27/24 0046    Lab Status: Final result Specimen: Blood from Arm, Left Updated: 04/27/24 0055     WBC 6.59 Thousand/uL      RBC 4.83 Million/uL      Hemoglobin 14.5 g/dL      Hematocrit 40.7 %      MCV 84 fL      MCH 30.0 pg      MCHC 35.6 g/dL      RDW 12.0 %      MPV 10.6 fL      Platelets 234 Thousands/uL      nRBC 0 /100 WBCs      Segmented % 65 %      Immature Grans % 0 %      Lymphocytes % 27 %      Monocytes % 7 %      Eosinophils Relative 1 %      Basophils Relative 0 %      Absolute Neutrophils 4.23 Thousands/µL      Absolute Immature Grans 0.02 Thousand/uL      Absolute Lymphocytes 1.75 Thousands/µL      Absolute Monocytes 0.49 Thousand/µL      Eosinophils Absolute 0.09 Thousand/µL      Basophils Absolute 0.01 Thousands/µL     POCT pregnancy, urine [[id number]]  (Normal) Resulted: 04/27/24 0044    Lab Status: Final result Updated: 04/27/24 0045     EXT Preg Test, Ur Negative     Control Valid    Urine Microscopic [[id number]]  (Abnormal) Collected: 04/26/24 2349    Lab Status: Final result Specimen: Urine, Clean Catch Updated: 04/27/24 0030     RBC, UA Innumerable /hpf      WBC, UA Innumerable /hpf      Epithelial Cells None Seen /hpf      Bacteria, UA Moderate /hpf     UA (URINE) with reflex to Scope [[id number]]  (Abnormal) Collected: 04/26/24 2349    Lab Status: Final result Specimen: Urine, Clean Catch Updated: 04/27/24 0001     Color, UA Yellow     Clarity, UA Cloudy     Specific Gravity, UA 1.010     pH, UA 6.0     Leukocytes, UA Trace     Nitrite, UA Negative     Protein, UA 1+ mg/dl      Glucose, UA 3+ mg/dl      Ketones, UA Negative mg/dl      Urobilinogen, UA 0.2 E.U./dl      Bilirubin, UA Negative     Occult Blood, UA 3+    Urine culture [[id number]]  Collected: 04/26/24 2340    Lab Status: In process Specimen: Urine, Clean Catch Updated: 04/26/24 2200                   No orders to display         Procedures  Procedures      ED Course                                       Medical Decision Making  Amount and/or Complexity of Data Reviewed  Labs: ordered.    Risk  Prescription drug management.          Disposition  Final diagnoses:   Acute UTI   History of bloody stools   Hyperglycemia     Time reflects when diagnosis was documented in both MDM as applicable and the Disposition within this note       Time User Action Codes Description Comment    4/27/2024 12:20 AM Raymond Pruett Add [N39.0] Acute UTI     4/27/2024 12:21 AM Raymond Pruett Add [Z87.19] History of bloody stools     4/27/2024 12:22 AM Raymond Pruett Modify [N39.0] Acute UTI     4/27/2024  1:14 AM Raymond Pruett [R73.9] Hyperglycemia     4/27/2024  1:14 AM Raymond Pruett Modify [R73.9] Hyperglycemia           ED Disposition       ED Disposition   Discharge    Condition   Stable    Date/Time   Sat Apr 27, 2024  1:15 AM    Comment   Sahra Kimbrough discharge to home/self care.                   Follow-up Information       Follow up With Specialties Details Why Contact Info Additional Information    Patel Gonzalez DO Internal Medicine Call in 1 day       [facility] [place] [facility] Call in 1 day  [address] [address] [phone] [facility] [address] [place] [address], [address] [phone]            Patient's Medications   Discharge Prescriptions    METFORMIN (GLUCOPHAGE) 500 MG TABLET    Take 1 tablet (500 mg total) by mouth 2 (two) times a day with meals for 21 days       Start Date: 4/27/2024 End Date: 5/18/2024       Order Dose: 500 mg       Quantity: 42 tablet    Refills: 0    NITROFURANTOIN (MACROBID) 100 MG CAPSULE    Take 1 capsule (100 mg total) by mouth  2 (two) times a day for 5 days       Start Date: 4/27/2024 End Date: 5/2/2024       Order Dose: 100 mg       Quantity: 9 capsule    Refills: 0         PDMP Review       None             ED Provider  Attending physically available and evaluated Sharaailyn Kimbrough. I managed the patient along with the ED Attending.    Electronically Signed by           Imani Hall MD  04/27/24 0116

## 2024-04-29 ENCOUNTER — TELEPHONE (OUTPATIENT)
Age: 49
End: 2024-04-29

## 2024-04-29 LAB
BACTERIA UR CULT: ABNORMAL
BACTERIA UR CULT: ABNORMAL

## 2024-05-02 ENCOUNTER — TELEPHONE (OUTPATIENT)
Dept: GASTROENTEROLOGY | Facility: CLINIC | Age: 49
End: 2024-05-02

## 2024-05-02 ENCOUNTER — APPOINTMENT (OUTPATIENT)
Dept: LAB | Facility: CLINIC | Age: 49
End: 2024-05-02

## 2024-05-02 ENCOUNTER — CONSULT (OUTPATIENT)
Dept: GASTROENTEROLOGY | Facility: CLINIC | Age: 49
End: 2024-05-02

## 2024-05-02 VITALS
DIASTOLIC BLOOD PRESSURE: 65 MMHG | SYSTOLIC BLOOD PRESSURE: 106 MMHG | HEART RATE: 73 BPM | WEIGHT: 169.4 LBS | BODY MASS INDEX: 31.17 KG/M2 | HEIGHT: 62 IN

## 2024-05-02 DIAGNOSIS — R10.13 EPIGASTRIC PAIN: ICD-10-CM

## 2024-05-02 DIAGNOSIS — Z90.49 HISTORY OF CHOLECYSTECTOMY: ICD-10-CM

## 2024-05-02 DIAGNOSIS — Z87.19 HISTORY OF BLOODY STOOLS: Primary | ICD-10-CM

## 2024-05-02 DIAGNOSIS — R74.8 ELEVATED ALKALINE PHOSPHATASE LEVEL: ICD-10-CM

## 2024-05-02 LAB
ALBUMIN SERPL BCP-MCNC: 4 G/DL (ref 3.5–5)
ALP SERPL-CCNC: 183 U/L (ref 34–104)
ALT SERPL W P-5'-P-CCNC: 19 U/L (ref 7–52)
AST SERPL W P-5'-P-CCNC: 14 U/L (ref 13–39)
BILIRUB DIRECT SERPL-MCNC: 0.11 MG/DL (ref 0–0.2)
BILIRUB SERPL-MCNC: 0.79 MG/DL (ref 0.2–1)
GGT SERPL-CCNC: 20 U/L (ref 9–64)
LIPASE SERPL-CCNC: 35 U/L (ref 11–82)
PROT SERPL-MCNC: 6.4 G/DL (ref 6.4–8.4)

## 2024-05-02 PROCEDURE — 36415 COLL VENOUS BLD VENIPUNCTURE: CPT

## 2024-05-02 PROCEDURE — 82977 ASSAY OF GGT: CPT

## 2024-05-02 PROCEDURE — 80076 HEPATIC FUNCTION PANEL: CPT

## 2024-05-02 PROCEDURE — 83690 ASSAY OF LIPASE: CPT

## 2024-05-02 PROCEDURE — 99204 OFFICE O/P NEW MOD 45 MIN: CPT | Performed by: NURSE PRACTITIONER

## 2024-05-02 RX ORDER — OMEPRAZOLE 20 MG/1
20 CAPSULE, DELAYED RELEASE ORAL DAILY
Qty: 30 CAPSULE | Refills: 1 | Status: SHIPPED | OUTPATIENT
Start: 2024-05-02 | End: 2024-07-01

## 2024-05-02 NOTE — PROGRESS NOTES
[facility] [place] - Outpatient Consultation  Sahra Kimbrough 48 y.o. female MRN: [id number]  Encounter: [id number]          ASSESSMENT AND PLAN:      1. History of bloody stools  Pt reports visualizing small amounts of blood with wiping intermittently prior to ED visit at the end of April. Her Hgb was stable at 14.5 and on rectal exam she had an external hemorrhoid. She does reports a longstanding hx of constipation and was doing well on fiber supplementation but has not been able to afford that more recently. She has never had a colonoscopy so will schedule at this time to r/o any underlying lesions. Prep and procedure explained. High fiber diet handout given.   -     Ambulatory Referral to Gastroenterology  -     Colonoscopy; Future; Expected date: 05/02/2024    2. Epigastric pain  Pt reports epigastric pain on palpation. Denies any N/V, HB, melena. She has been taking Naproxen PRN.    -Check Lipase  -Avoid NSAIDs. Start Omeprazole 20mg daily  -EGD scheduled with colonoscopy to evaluate for PUD.  -Consider CT scan pending course  -     omeprazole (PriLOSEC) 20 mg delayed release capsule; Take 1 capsule (20 mg total) by mouth daily  -     EGD; Future; Expected date: 05/02/2024  -     Lipase; Future    3. Elevated alkaline phosphatase level  Pt with history of isolated intermittent alkaline phosphatase elevation 133-278 on labs going back to 2020. Will check recheck alkaline phosphatase and GTT.  If GGT elevated, will pursue further workup with RUQ US & liver serologies.   -     Gamma GT; Future  -     Hepatic function panel; Future    4. History of cholecystectomy  S/p cholecystectomy in 2019 by [facility]      ______________________________________________________________________    HPI:  Sahra Kimbrough is a Uzbek speaking 48 y.o. female with PMH T2DM presenting with her daughter to establish care due to epigastric pain and rectal bleeding.     She was recently in the ED at the end of April due to  UTI symptoms and rectal bleeding and labs notable for Na 134, Glucose 370, Alk Phos 278. She had no leukocytosis and Hgb was stable at 14.5. UA came back positive for UTI and culture grew E.Coli and she was treated with antibiotics and had rectal exam noting an external hemorrhoid.    Pt reports chronic hx of constipation since childhood, she was on a fiber supplement which helped in the past, but then her financial situation changed and she was no longer able to afford this. She reports BM every day to every other day but often has to strain. She has never had a colonoscopy. Denies any known family hx of colon cancer.    She also reports epigastric pain on palpation. Denies any HB, nausea/vomiting, melena. She does take Naproxen for pain intermittently. Has not had an EGD. She was Pepcid and Lansoprazole on her med list but denies taking these.     She had her gallbladder removed in 2019 by [facility].      REVIEW OF SYSTEMS:    CONSTITUTIONAL: Denies any fever, chills, rigors, and weight loss.  HEENT: No earache or tinnitus.  CARDIOVASCULAR: No chest pain or palpitations.   RESPIRATORY: Denies any cough, hemoptysis, shortness of breath or dyspnea on exertion.  GASTROINTESTINAL: As noted in the History of Present Illness.   GENITOURINARY: Denies any hematuria or dysuria.  NEUROLOGIC: No dizziness or vertigo.   MUSCULOSKELETAL: Denies any joint swellings.  SKIN: Denies skin rashes or itching.   ENDOCRINE: Denies excessive thirst. Denies intolerance to heat or cold.  PSYCHOSOCIAL: Denies depression or anxiety. Denies any recent memory loss.       Historical Information   Past Medical History:   Diagnosis Date    Diabetes mellitus (HCC)      History reviewed. No pertinent surgical history.  Social History   Social History     Substance and Sexual Activity   Alcohol Use Yes    Comment: socially     Social History     Substance and Sexual Activity   Drug Use Never     Social History     Tobacco Use   Smoking Status Never  "  Smokeless Tobacco Never     History reviewed. No pertinent family history.    Meds/Allergies       Current Outpatient Medications:     clotrimazole (LOTRIMIN) 1 % cream    metFORMIN (GLUCOPHAGE) 500 mg tablet    naproxen (Naprosyn) 500 mg tablet    omeprazole (PriLOSEC) 20 mg delayed release capsule    acetaminophen (TYLENOL) 500 mg tablet    fluticasone (FLONASE) 50 mcg/act nasal spray    nitrofurantoin (MACROBID) 100 mg capsule    No Known Allergies        Objective     Blood pressure 106/65, pulse 73, height 5' 2\" (1.575 m), weight 76.8 kg (169 lb 6.4 oz). Body mass index is 30.98 kg/m².        PHYSICAL EXAM:      General Appearance:   Alert, cooperative, no distress   HEENT:   Normocephalic, atraumatic, anicteric.     Neck:  Supple, symmetrical, trachea midline   Lungs:   Clear to auscultation bilaterally; no rales, rhonchi or wheezing; respirations unlabored    Heart::   Regular rate and rhythm; no murmur.   Abdomen:   Soft, non-tender, non-distended; normal bowel sounds; no masses, no organomegaly    Genitalia:   Deferred    Rectal:   Deferred    Extremities:  No cyanosis, clubbing or edema    Skin:  No jaundice, rashes, or lesions    Lymph nodes:  No palpable cervical lymphadenopathy        Lab Results:   No visits with results within 1 Day(s) from this visit.   Latest known visit with results is:   Admission on 04/26/2024, Discharged on 04/27/2024   Component Date Value    Color, UA 04/26/2024 Yellow     Clarity, UA 04/26/2024 Cloudy (A)     Specific [place], UA 04/26/2024 1.010     pH, UA 04/26/2024 6.0     Leukocytes, UA 04/26/2024 Trace (A)     Nitrite, UA 04/26/2024 Negative     Protein, UA 04/26/2024 1+ (A)     Glucose, UA 04/26/2024 3+ (A)     Ketones, UA 04/26/2024 Negative     Urobilinogen, UA 04/26/2024 0.2     Bilirubin, UA 04/26/2024 Negative     Occult Blood, UA 04/26/2024 3+ (A)     Urine Culture 04/26/2024 >100,000 cfu/ml Escherichia coli (A)     Urine Culture 04/26/2024 >100,000 cfu/ml " Escherichia coli (A)     EXT Preg Test, Ur 04/27/2024 Negative     Control 04/27/2024 Valid     RBC, UA 04/26/2024 Innumerable (A)     WBC, UA 04/26/2024 Innumerable (A)     Epithelial Cells 04/26/2024 None Seen     Bacteria, UA 04/26/2024 Moderate (A)     WBC 04/27/2024 6.59     RBC 04/27/2024 4.83     Hemoglobin 04/27/2024 14.5     Hematocrit 04/27/2024 40.7     MCV 04/27/2024 84     MCH 04/27/2024 30.0     MCHC 04/27/2024 35.6     RDW 04/27/2024 12.0     MPV 04/27/2024 10.6     Platelets 04/27/2024 234     nRBC 04/27/2024 0     Segmented % 04/27/2024 65     Immature Grans % 04/27/2024 0     Lymphocytes % 04/27/2024 27     Monocytes % 04/27/2024 7     Eosinophils Relative 04/27/2024 1     Basophils Relative 04/27/2024 0     Absolute Neutrophils 04/27/2024 4.23     Absolute Immature Grans 04/27/2024 0.02     Absolute Lymphocytes 04/27/2024 1.75     Absolute Monocytes 04/27/2024 0.49     Eosinophils Absolute 04/27/2024 0.09     Basophils Absolute 04/27/2024 0.01     Sodium 04/27/2024 134 (L)     Potassium 04/27/2024 3.6     Chloride 04/27/2024 99     CO2 04/27/2024 25     ANION GAP 04/27/2024 10     BUN 04/27/2024 15     Creatinine 04/27/2024 0.57 (L)     Glucose 04/27/2024 370 (H)     Calcium 04/27/2024 9.5     AST 04/27/2024 15     ALT 04/27/2024 24     Alkaline Phosphatase 04/27/2024 278 (H)     Total Protein 04/27/2024 7.4     Albumin 04/27/2024 4.1     Total Bilirubin 04/27/2024 0.75     eGFR 04/27/2024 108     Magnesium 04/27/2024 1.9     Lipase 04/27/2024 36     PTT 04/27/2024 27     Protime 04/27/2024 11.8     INR 04/27/2024 0.87     OCCULT BLD, FECAL IMMUNO* 04/27/2024 Positive (A)          Radiology Results:   No results found.

## 2024-05-02 NOTE — PATIENT INSTRUCTIONS
-Increase dietary fiber to help with constipation or restart a daily fiber supplement to help with constipation  -Get blood work done to check your pancreas, bile ducts  -Start Omeprazole daily 1/2 hour before breakfast to decrease stomach inflammation  -Take tylenol for pain, avoid NSAIDs (Advil, Aleeve, Naproxen, Ibuprofen, Motrin)  -We will schedule EGD/Colonoscopy for further evaluation of your epigastric pain     Enfermedad por reflujo gastroesofágico (ERGE)   LO QUE NECESITA SABER:   La enfermedad por reflujo gastroesofágico (ERGE) es el reflujo que ocurre más de 2 veces a la semana pan varias semanas. Reflujo significa que el ácido y los alimentos en el estómago regresan al esófago. La ERGE puede causar otros problemas de karthik con el tiempo si no es tratada.       [facility]:   Llame al número de emergencias local (911 en [place]) si:  Usted siente dolor annemarie en el pecho y dificultad repentina para respirar.      Regrese a la shonda de emergencias si:  Tiene dificultad para respirar después de vomitar.    Tiene dificultad para tragar o dolor al tragar.    Dat evacuaciones intestinales son de color edgar, con kendra, o de apariencia alquitranada.    Waldrop vómito parece ashley café molido o contiene kendra.    Llame a waldrop médico o gastroenterólogo si:  Usted siente llenura y no puede eructar o vomitar.    Vomita grandes cantidades, o vomita con frecuencia.    Usted pierde peso sin proponérselo.    Dat síntomas empeoran o no mejoran con el tratamiento.    Usted tiene preguntas o inquietudes acerca de waldrop condición o cuidado.    Medicamentos:  Los medicamentos para disminuir el ácido estomacal. Los medicamentos también podrían usarse para ayudar a que waldrop esfínter esofágico inferior y waldrop estómago se contraigan (estrechen) más.    [place] dat medicamentos ashley se le haya indicado. Consulte con waldrop médico si usted navneet que waldrop medicamento no le está ayudando o si presenta efectos  secundarios. Infórmele al médico si usted es alérgico a algún medicamento. Mantenga jorge alberto lista actualizada de los medicamentos, las vitaminas y los productos herbales que rakesh. Incluya los siguientes datos de los medicamentos: cantidad, frecuencia y motivo de administración. Traiga con usted la lista o los envases de las píldoras a dat citas de seguimiento. Lleve la lista de los medicamentos con usted en jeff de jorge alberto emergencia.    Control de la ERGE:      No consuma alimentos o bebidas que puedan aumentar la acidez. Estos incluyen chocolate, menta, comidas fritas o grasosas, bebidas que contienen cafeína o bebidas gaseosas. Otros alimentos incluyen comidas picantes, cebollas, tomates y alimentos a base de tomate. No consuma alimentos y bebidas que puedan irritar waldrop esófago, ashley las frutas cítricas, los jugos y las bebidas alcohólicas.    No ingiera comidas abundantes. Cuando usted come mucha comida a la vez, waldrop estómago necesita más ácido para digerirla. Consuma 6 comidas pequeñas al día en vez de 3 comidas grandes y coma lentamente. No consuma alimentos entre 2 y 3 horas antes de acostarse.    Eleve la cabecera de waldrop cama. Coloque bloques de 6 pulgadas debajo de la cabecera de la estructura de waldrop cama. También podría usar más jorge alberto almohada para apoyar waldrop neno y hombros mientras duerme.    Mantenga un peso saludable. Si usted tiene sobrepeso, la pérdida de peso podría ayudar a aliviar los síntomas de la ERGE.    No fume. Fumar debilita el esfínter esofágico inferior y aumenta el riesgo de ERGE. Pida información a waldrop médico si usted actualmente fuma y necesita ayuda para dejar de fumar. Los cigarrillos electrónicos o el tabaco sin humo igualmente contienen nicotina. Consulte con waldrop médico antes de utilizar estos productos.    No aplique presión sobre el abdomen. La presión empuja el ácido hacia el esófago. No use ropa que queda ajustada alrededor de la cintura. No se agache. Doble las rodillas para recoger  algo.  Acuda a dat consultas de control con waldrop médico o gastroenterólogo según le indicaron: Anote dat preguntas para que se acuerde de hacerlas pan dat visitas.  © Copyright [employer] 2023 Information is for End User's use only and may not be sold, redistributed or otherwise used for commercial purposes.  Esta información es sólo para uso en educación. Waldrop intención no es darle un consejo médico sobre enfermedades o tratamientos. Colsulte con waldrpo médico, enfermera o farmacéutico antes de seguir cualquier régimen médico para saber si es seguro y efectivo para usted.  Dieta con alto contenido de fibra   LO QUE NECESITA SABER:   Jorge Alberto dieta con alto contenido de fibra incluye alimentos con jorge alberto johan cantidad de fibra. La fibra es la parte de las frutas, los vegetales y los granos, que no se descompone al ser ingerida por waldrop cuerpo. La fibra ayuda a regular las evacuaciones intestinales. La fibra además ayuda a bajar el colesterol, controlar la glucosa en la kendra en las personas que sufren de diabetes y para aliviar el estreñimiento. También la fibra podría ayudarle a controlar waldrop peso debido a que le da la sensación de llenura más rápidamente. La mayoría de los adultos deberían consumir entre 25 a 35 gramos de fibra al día. Consulte con waldrop dietista o médico sobre la cantidad de fibra que usted necesita.  [facility]:   Buenas angelo de fibra:      Alimentos que contienen al menos 4 gramos de fibra por porción:     ? a ½ de jorge alberto taza de cereal con alto contenido de fibra (jose alfredo la etiqueta nutricional en la caja)    ½ taza de moras o frambuesas    4 ciruelas pasas    1 alcachofa cocida    ½ taza de legumbres cocidas, ashley lentejas o frijoles rojos o pintos    Alimentos que contienen 1 a 3 gramos de fibra por porción:     1 rebanada de pan de bonny integral, pan integral de romero o pan de romero    ½ taza de arroz integral cocido    4 galletas integrales    1 taza de be    ½ taza de  cereal con 1 a 3 gramos de fibra por porción (jose alfredo la etiqueta nutricional en la caja)    1 porción pequeña de fruta ashley manzana, banana, richard, kiwi o naranja    3 dátiles    ½ taza de albaricoques enlatados, ensalada de frutas, duraznos o peras    ½ taza de verduras crudas o cocidas, ashley zanahorias, coliflor, repollo, espinaca, calabaza o maíz  Formas en que puede aumentar la fibra en waldrop dieta:  Escoja arroz integral o robb en vez de arroz castellanos.    Use harina de grano integral en las recetas en vez de harina veronica.    Añada legumbres y arvejas a los guisos o las sopas.    Escoja fruta y verduras frescas con la cascara en vez de jugos.    Otras pautas dietéticas que debe seguir:  Añada fibra a waldrop dieta lentamente. Usted podría presentar malestar o inflamación estomacal y gases si añade fibra a waldrop dieta demasiado rápido.    Connie mucho líquido a medida que agrega fibra a waldrop dieta. Es posible que tenga náuseas o desarrolle estreñimiento si no rakesh suficiente agua. Pregunte cuánto líquido debe miugel cada día y cuáles líquidos son los más adecuados para usted.    © Copyright [employer] 2023 Information is for End User's use only and may not be sold, redistributed or otherwise used for commercial purposes.  Esta información es sólo para uso en educación. Waldrop intención no es darle un consejo médico sobre enfermedades o tratamientos. Colsulte con waldrop médico, enfermera o farmacéutico antes de seguir cualquier régimen médico para saber si es seguro y efectivo para usted.

## 2024-05-02 NOTE — TELEPHONE ENCOUNTER
Scheduled date of EGD/colonoscopy (as of today): 7/11/24  Physician performing EGD/colonoscopy: Dr. Merritt  Location of EGD/colonoscopy:   Desired bowel prep reviewed with patient: miralax  Instructions reviewed with patient by: tl given at appt  Clearances:      Diabetic  Metformin

## 2024-05-03 ENCOUNTER — OFFICE VISIT (OUTPATIENT)
Dept: OBGYN CLINIC | Facility: CLINIC | Age: 49
End: 2024-05-03

## 2024-05-03 ENCOUNTER — APPOINTMENT (OUTPATIENT)
Dept: LAB | Facility: CLINIC | Age: 49
End: 2024-05-03

## 2024-05-03 VITALS
DIASTOLIC BLOOD PRESSURE: 75 MMHG | RESPIRATION RATE: 18 BRPM | BODY MASS INDEX: 31.28 KG/M2 | HEIGHT: 62 IN | SYSTOLIC BLOOD PRESSURE: 107 MMHG | HEART RATE: 84 BPM | WEIGHT: 170 LBS

## 2024-05-03 DIAGNOSIS — E11.9 TYPE 2 DIABETES MELLITUS WITHOUT COMPLICATION, WITHOUT LONG-TERM CURRENT USE OF INSULIN (HCC): ICD-10-CM

## 2024-05-03 DIAGNOSIS — N89.8 VAGINAL ITCHING: ICD-10-CM

## 2024-05-03 DIAGNOSIS — E04.1 THYROID NODULE: ICD-10-CM

## 2024-05-03 DIAGNOSIS — Z20.2 POSSIBLE EXPOSURE TO STD: ICD-10-CM

## 2024-05-03 DIAGNOSIS — L90.0 LICHEN SCLEROSUS: Primary | ICD-10-CM

## 2024-05-03 LAB
BV WHIFF TEST VAG QL: NEGATIVE
CLUE CELLS SPEC QL WET PREP: NEGATIVE
PH SMN: 4.5 [PH]
SL AMB POCT WET MOUNT: NORMAL
T VAGINALIS VAG QL WET PREP: NEGATIVE
TSH SERPL DL<=0.05 MIU/L-ACNC: 1.68 UIU/ML (ref 0.45–4.5)
YEAST VAG QL WET PREP: NEGATIVE

## 2024-05-03 PROCEDURE — 36415 COLL VENOUS BLD VENIPUNCTURE: CPT

## 2024-05-03 PROCEDURE — 99213 OFFICE O/P EST LOW 20 MIN: CPT | Performed by: OBSTETRICS & GYNECOLOGY

## 2024-05-03 PROCEDURE — 87210 SMEAR WET MOUNT SALINE/INK: CPT | Performed by: OBSTETRICS & GYNECOLOGY

## 2024-05-03 PROCEDURE — 83036 HEMOGLOBIN GLYCOSYLATED A1C: CPT

## 2024-05-03 PROCEDURE — 87591 N.GONORRHOEAE DNA AMP PROB: CPT

## 2024-05-03 PROCEDURE — 87491 CHLMYD TRACH DNA AMP PROBE: CPT

## 2024-05-03 PROCEDURE — 84443 ASSAY THYROID STIM HORMONE: CPT

## 2024-05-03 RX ORDER — CLOBETASOL PROPIONATE 0.5 MG/G
OINTMENT TOPICAL
Qty: 60 G | Refills: 0 | Status: SHIPPED | OUTPATIENT
Start: 2024-05-03

## 2024-05-03 NOTE — PATIENT INSTRUCTIONS
Dr. Cortes    Lichen sclerosus (LS) is a skin disorder that causes the skin to become thin, whitened, and wrinkled, and can cause itching and pain. LS usually occurs in postmenopausal women, although men, children, and premenopausal women may be affected. It can develop on any skin surface, but in women it most commonly occurs near the clitoris, on the labia (the inner and outer genital lips), and in the anal region (figure 1). In 15 to 20 percent of patients, LS lesions develop on other skin surfaces, such as the thighs, breasts, wrists, shoulders, neck, and even inside the mouth.  It is not clear exactly how many people have LS. Estimates for LS involving the female genitals vary from 1 in 30 older adult women seen in general gynecology offices to 1 in 300 to 1000 patients referred to dermatologists.  LICHEN SCLEROSUS CAUSES AND RISK FACTORS   The cause of lichen sclerosus (LS) is not clear; health care providers suspect that a number of factors may be involved.  Genetic factors -- LS seems to be more common in some families. People who are genetically predisposed to LS may develop symptoms after experiencing trauma, injury, or sexual abuse.  Disorders of the immune system -- LS in females may be an autoimmune disorder, in which the body's immune system mistakenly attacks and injures the skin. Women with LS are at greater risk of developing other autoimmune disorders, such as some types of thyroid disease, anemia, diabetes, alopecia areata, and vitiligo [1].  Infections -- Researchers have not been able to clearly demonstrate any relationship between infections and LS. LS is not contagious.  Hormones -- LS is more common in prepubertal girls and postmenopausal women, suggesting that hormonal changes influence the disease. However, treatments such as hormone replacement therapy or the application of testosterone or progesterone have not been shown to be effective for females with LS.  Urine -- There is evidence  "that urine may contribute to genital LS in males, in that microscopic droplets of urine may pool between the glans penis and the foreskin, contributing to LS in uncircumcised men. However, whether urine plays a part in genital LS in females is unknown.  LICHEN SCLEROSUS (LS) SIGNS AND SYMPTOMS   Features of genital LS in women -- Some women with genital LS feel dull, painful discomfort in the vulva, while other women have no symptoms. The most common symptoms include:  ?Vulvar itching - The most common symptom of LS is itching. It may be so severe that it interferes with sleep.  ?Anal itching, fissures, bleeding, and pain - (See \"Patient education: Anal fissure (Beyond the Basics)\".)  ?Painful sexual intercourse (dyspareunia) - This can occur as a result of repeated cracking of the skin (fissuring) or from narrowing of the vaginal opening due to scarring.  Typically, women with genital LS have thin, white, wrinkled skin on the labia, often extending down and around the anus (figure 1). Purple-colored areas of bruising may be seen. Cracks (also known as fissures) may form in the skin in the area around the anus, the labia, and the clitoris. Relatively minor rubbing or sex may lead to bleeding due to the fragility of the involved skin.  Genital LS may progress and change the appearance of the genital area as the outer and inner lips of the vulva fuse (stick together) and cover the clitoris. The opening of the vagina can become narrowed, and cracks, fissures, and thickened, scarred skin in the genital and anal area can make sexual intercourse or genital examination painful. LS does not affect the inner reproductive organs, such as the vagina and uterus.  LICHEN SCLEROSUS DIAGNOSIS   Providers typically use the following methods to diagnose lichen sclerosus (LS).  History and physical examination -- A medical history and physical examination of the vulvar and anal areas will be done, looking for the signs and symptoms " "of LS. A general skin examination may also be performed to exclude LS elsewhere on the body.  Biopsy -- To confirm a suspected diagnosis of LS, a biopsy is recommended. A small piece of the affected skin will be removed and sent to a pathologist to be examined with a microscope.  Excluding other conditions -- Tests may be done to exclude other conditions that could cause symptoms similar to those of LS, such as:  ?Lichen planus (a skin disease that can also cause itching and fusing of genital skin). Lichen planus can occur together with LS.  ?Low estrogen level (a lack of the hormone estrogen can rarely cause fusing of genital skin but is often the cause of painful intercourse). (See \"Patient education: Vaginal dryness (Beyond the Basics)\".)  ?Vitiligo (a disorder that can cause white skin patches similar to those of LS). Vitiligo can occur together with LS.  ?Pemphigoid (a blistering skin disorder that also causes scarring of the vulva) is extremely rare.  ?Infections can cause similar symptoms but usually do not cause the typical skin changes of LS. However, infection can occur together with LS.  LS and cancer -- Women with LS affecting the vulva are at a slightly increased risk for developing squamous cell skin cancer of the vulva.  Diagnosing genital LS early, treating it effectively, and biopsying any abnormal areas may help to reduce the risk of developing or missing a diagnosis of skin cancer. A once-yearly examination of the skin of the vulva is recommended, and women should examine themselves regularly for lumps or sores that do not heal. A biopsy should be performed if there are areas that do not improve with treatment. There is early evidence to suggest that good control of LS may reduce the risk of vulval cancer.  LS lesions outside the genital area do not have an increased risk of cancer.  LS and painful sexual intercourse -- LS can lead to constriction of the vaginal opening and pain during sexual " intercourse. Women who experience pain during sex first require treatment to suppress any active disease. Once the disease is controlled, some clinicians may recommend an estrogen cream to help to soften the skin around the vaginal opening. Devices called vaginal dilators, which patients can use at home, also may be used to slowly stretch the skin.  Pain with intercourse can also occur from other causes. Patients who notice pain during intercourse should discuss their symptoms with their health care providers.  LICHEN SCLEROSUS TREATMENT   The goals of treatment of lichen sclerosus (LS) are to relieve bothersome symptoms and to prevent the condition from worsening. A clinician may recommend medication for the physical symptoms, and may refer the patient for support and therapy for other issues associated with the condition, such as problems with sex.  All patients with genital LS, even those without noticeable symptoms, need to use medication on a regular and ongoing basis. Patients also should see a health care provider for reevaluation of the disease at least once or twice yearly.  Patients who are diagnosed with genital LS should talk to their clinician about:  ?The lifelong and potentially progressive nature of LS; appropriate treatment can stop the condition from worsening.  ?Ways to manage the condition.  ?The slightly increased risk of vulvar cancer and the need for ongoing monitoring.  ?How to keep the genital area healthy and avoid scratching (table 1).  ?Persistent pain with intercourse.  ?Good vulval hygiene, including avoidance of irritant products (eg, soaps, douches, and body washes) and the use of a bland emollient (moisturizer).  Depending on the severity of the condition, a health care provider may recommend one or more of the following treatments for genital LS:  ?Steroid ointments are recommended to reduce inflammation and itching. They are the treatment of choice for genital LS. Strong steroid  "ointments (eg, clobetasol propionate) are the mainstay of treatment for genital LS and are effective in most women. Initial treatment usually requires daily application of the ointment for one to three months to resolve the symptoms and reduce inflammation. After the initial course, most women require \"maintenance\" therapy with either less frequent application of the strong steroid ointment or a switch to a less potent steroid. Although there may be warnings on the product about the use of topical steroids on genital skin, it is important to use an adequate amount to bring the disease under control. The health care provider will provide guidance about the amount to use and frequency of application.  ?Steroid injections, especially if steroid ointments are not effective.  Another class of topical medications are the calcineurin inhibitors (eg, tacrolimus or pimecrolimus), which are sometimes prescribed for patients who respond poorly to steroids or cannot tolerate steroid treatment.  Although it is not approved by the [place] Food and Drug Administration (FDA) for this use, an oral medication called acitretin has also been used for the treatment of LS in some patients. Because it has many side effects, including a risk for liver damage, the drug is used primarily in patients who have not been helped by other treatments. Acitretin can cause severe birth defects, and women should not get pregnant during treatment or for three years after taking the drug. For this reason, acitretin usually is not recommended for women of child-bearing age.  Some women with LS may develop abnormal fusion of the labia and/or scarring. Vaginal dilators can be used in this situation to stretch the skin to help restore normal function. Surgery may also be used in this situation. It is important to continue medical treatment (with corticosteroids) and dilators after surgery to prevent the recurrence of scarring.  Vulval pain sometimes persists " despite treatment with topical steroids. Treatment approaches for this include self-massage and dilator therapy to reduce pain with sexual intercourse. Sometimes oral medications are recommended.

## 2024-05-03 NOTE — ASSESSMENT & PLAN NOTE
Lab Results   Component Value Date    HGBA1C 6.7 (H) 09/30/2020   3+ glucose in urine with elevated Glucose levels in ED  Continue Metformin  F/u HbgA1c  Follow up with Endo on 08/07  Establish with PCP

## 2024-05-03 NOTE — ASSESSMENT & PLAN NOTE
Clinical exam shows: exophthalmos, right thyroidmegaly with palpable thyroid nodule  F/u TSH and Thyroid US  Follow up with Endo on 08/07/2024  Follow up with PCP- referral made to [facility]

## 2024-05-03 NOTE — ASSESSMENT & PLAN NOTE
Intense vaginal itching, clinical exam significant for white shiny plaque rash surround vaginal opening, volvar region and expands into gluteal cleft, supporting lichen sclerosus   History of diabetes  Wet mount negative    Plan:  Start Clobetasol ointment nightly for 4 weeks, followed by alternating nights for 4 weeks followed by twice per week for 4 weeks  Follow up in 3 months for recheck of Lichen sclerosus   F/u G/C swab

## 2024-05-04 LAB
C TRACH DNA SPEC QL NAA+PROBE: NEGATIVE
EST. AVERAGE GLUCOSE BLD GHB EST-MCNC: 329 MG/DL
HBA1C MFR BLD: 13.1 %
N GONORRHOEA DNA SPEC QL NAA+PROBE: NEGATIVE

## 2024-05-09 ENCOUNTER — TELEPHONE (OUTPATIENT)
Dept: GASTROENTEROLOGY | Facility: CLINIC | Age: 49
End: 2024-05-09

## 2024-05-09 NOTE — TELEPHONE ENCOUNTER
Dr. Merritt patient/ has seen OTILIA Wilson also    Called and spoke to patient via the interpretor line with rep Shannon [phone]     Patient would like a follow up appt to see someone either before or shortly after her procedure 07/11/24     Can someone please assist?    I could not find a sooner appt and patient only would like to go to the [facility]

## 2024-05-15 ENCOUNTER — HOSPITAL ENCOUNTER (OUTPATIENT)
Dept: ULTRASOUND IMAGING | Facility: HOSPITAL | Age: 49
Discharge: HOME | End: 2024-05-15

## 2024-05-15 DIAGNOSIS — E04.1 THYROID NODULE: ICD-10-CM

## 2024-05-15 PROCEDURE — 76536 US EXAM OF HEAD AND NECK: CPT

## 2024-05-17 ENCOUNTER — TELEPHONE (OUTPATIENT)
Dept: OBGYN CLINIC | Facility: CLINIC | Age: 49
End: 2024-05-17

## 2024-05-17 NOTE — TELEPHONE ENCOUNTER
----- Message from Sarah Cortes DO sent at 5/17/2024 10:59 AM EDT -----  Chlamydia and Gonorrhea testing negative. TSH normal. HbgA1c elevated at 13.1, which correlates with uncontrolled diabetes. Please call patient and let her know the results of negative STD testing, normal thyroid function, and uncontrolled Diabetes. She should follow up with a PCP for further management of her diabetes. Thank you.

## 2024-05-20 NOTE — TELEPHONE ENCOUNTER
ID [id number]    Called and spoke to patient, informed her of the test results and recommendations. Provided her with the number for family practice with [facility] for diabetes management. Annual appointment has been scheduled with OBGYN

## 2024-05-28 ENCOUNTER — ANNUAL EXAM (OUTPATIENT)
Dept: OBGYN CLINIC | Facility: CLINIC | Age: 49
End: 2024-05-28

## 2024-05-28 VITALS
WEIGHT: 168 LBS | DIASTOLIC BLOOD PRESSURE: 74 MMHG | HEIGHT: 62 IN | RESPIRATION RATE: 18 BRPM | SYSTOLIC BLOOD PRESSURE: 118 MMHG | HEART RATE: 96 BPM | BODY MASS INDEX: 30.91 KG/M2

## 2024-05-28 DIAGNOSIS — Z12.31 ENCOUNTER FOR SCREENING MAMMOGRAM FOR MALIGNANT NEOPLASM OF BREAST: ICD-10-CM

## 2024-05-28 DIAGNOSIS — Z60.3 LANGUAGE BARRIER: ICD-10-CM

## 2024-05-28 DIAGNOSIS — Z75.8 LANGUAGE BARRIER: ICD-10-CM

## 2024-05-28 DIAGNOSIS — Z01.419 ENCOUNTER FOR GYNECOLOGICAL EXAMINATION WITHOUT ABNORMAL FINDING: Primary | ICD-10-CM

## 2024-05-28 PROCEDURE — G0145 SCR C/V CYTO,THINLAYER,RESCR: HCPCS | Performed by: NURSE PRACTITIONER

## 2024-05-28 PROCEDURE — G0476 HPV COMBO ASSAY CA SCREEN: HCPCS | Performed by: NURSE PRACTITIONER

## 2024-05-28 PROCEDURE — 99396 PREV VISIT EST AGE 40-64: CPT | Performed by: NURSE PRACTITIONER

## 2024-05-28 SDOH — SOCIAL STABILITY - SOCIAL INSECURITY: ACCULTURATION DIFFICULTY: Z60.3

## 2024-05-28 NOTE — PROGRESS NOTES
"Jorge Kimbrough is a 48 y.o. female who presents for annual well woman exam.  She is with her daughter today who wishes to interpret LMP 24.   gets menses every 3-4 months x3 days.    She recently started treatment for lichen sclerosus, is using clobetasol and is to have follow-up in 3 months.  She reports her symptoms have improved still will get some minor itching . Denies any discharge.   Her last Pap was done 2020 and was negative with negative high-risk HPV.  Reviewed recommendation of Pap due 2025,  patient reports she does not want to wait until next year wants to have Pap and HPV done today    GYN:  no vaginal discharge, labial erythema or lesions, dyspareunia.  Contraception: tubal.  Patient is not sexually active .  Gynecologic surgery: tubal.  History of hysteroscopy resection of cervical fibroid    OB:   female. Pregnancies were complicated by c/s x1.    :  no dysuria, urinary frequency or urgency.  no hematuria, flank pain, incontinence.    Breast:  no breast mass, skin changes, dimpling, reddening, nipple retraction.  no breast discharge.  Last mammogram was in 2021. Results were negative, ABUS was suggested for additional screening for heterogeneously dense breasts.   Patient does not have a family history of breast, endometrial, or ovarian ca.     General:  Diet: DM diet   Exercise: no no  Work: no  ETOH use: no  Tobacco use: no  Recreational drug use: no    Screening:  Cervical cancer: last pap smear and HPV in 2020. Results were negative.  Breast cancer: last mammogram in 2021. Results were negative.  Colon cancer: last colonoscopy - has never had- but awaiting appt with GI to discuss.   STD screening: pt declines.    Review of Systems  Pertinent items are noted in HPI.      Objective      /74 (BP Location: Right arm, Patient Position: Sitting, Cuff Size: Adult)   Pulse 96   Resp 18   Ht 5' 2\" (1.575 m)   Wt 76.2 kg (168 lb)   LMP " 2024 (Approximate)   BMI 30.73 kg/m²     Physical Exam  Constitutional:       Appearance: Normal appearance.   Cardiovascular:      Rate and Rhythm: Normal rate and regular rhythm.   Pulmonary:      Effort: Pulmonary effort is normal.      Breath sounds: Normal breath sounds.   Chest:   Breasts:     Right: Normal. No mass, nipple discharge, skin change or tenderness.      Left: No mass, nipple discharge, skin change or tenderness.   Abdominal:      Palpations: Abdomen is soft.      Tenderness: There is no abdominal tenderness.   Genitourinary:     Labia:         Right: Rash and lesion present. No tenderness.         Left: Rash present. No tenderness or lesion.       Urethra: No urethral lesion.      Vagina: No vaginal discharge.      Cervix: Normal. No lesion.      Uterus: Normal. Not enlarged and not tender.       Adnexa:         Right: No tenderness or fullness.          Left: No tenderness or fullness.        Rectum: No mass.          Comments: LS healing, no erythema or whiteness, skin color slightly pale on labia and down to rectum. 1 small lesions Rt labia from scratching- reviewed to monitor if persist to RTO.   Lymphadenopathy:      Upper Body:      Right upper body: No supraclavicular, axillary or pectoral adenopathy.      Left upper body: No supraclavicular, axillary or pectoral adenopathy.                 Assessment     Sahra Kimbrough is a 48 y.o.  with abnormal gynecologic exam.     Plan     .     50-65 postmenopausal  1.  Routine well woman exam done today.  2.  Pap and HPV:Pap with HPV done today, pt desires.  Current ASCCP Guidelines reviewed.   3.  Mammogram ordered, to be done through [facility]. Recommend yearly mammography.  Await results, if dense breast consider ABUS  4.  Colonoscopy recommended per guidelines.  Patient is awaiting appointment to meet with GI.  Appointment with endoscopy 2024.   5. The patient is not sexually active.   6. The following were reviewed in  today's visit: breast self exam, mammography screening ordered, STD testing, menopause, adequate intake of calcium and vitamin D, exercise, and healthy diet.  7. Patient to return to office in 3 months for LS check.   8.  Patient has appointment with endocrine 8/7/2024 but also desires appointment with PCP which she needs to establish will give her information for [facility]      BMI Counseling: Body mass index is 30.73 kg/m². The BMI is above normal. Nutrition recommendations include encouraging healthy choices of fruits and vegetables and moderation in carbohydrate intake. Exercise recommendations include exercising 3-5 times per week. No pharmacotherapy was ordered. Rationale for BMI follow-up plan is due to patient being overweight or obese.     Depression Screening and Follow-up Plan: Patient was screened for depression during today's encounter. They screened negative with a PHQ-2 score of 1.

## 2024-05-29 ENCOUNTER — TELEPHONE (OUTPATIENT)
Dept: OTHER | Facility: OTHER | Age: 49
End: 2024-05-29

## 2024-05-29 NOTE — TELEPHONE ENCOUNTER
Progress Note:  Spoke with patient who asked for RN to schedule mammogram appointment in [place]. Aware that member of [facility] Team will reach out to patient regarding coverage, costs, etc.     Mammogram Screening ([facility]):  Pap smear screening (Clinic vs [facility] vs [facility]):  PCP (Clinic vs [facility] vs [facility]):     Transportation:     Education:

## 2024-05-30 ENCOUNTER — PATIENT OUTREACH (OUTPATIENT)
Facility: HOSPITAL | Age: 49
End: 2024-05-30

## 2024-05-30 LAB
HPV HR 12 DNA CVX QL NAA+PROBE: NEGATIVE
HPV16 DNA CVX QL NAA+PROBE: NEGATIVE
HPV18 DNA CVX QL NAA+PROBE: NEGATIVE

## 2024-06-06 LAB
LAB AP GYN PRIMARY INTERPRETATION: NORMAL
Lab: NORMAL

## 2024-06-10 ENCOUNTER — TELEPHONE (OUTPATIENT)
Dept: OBGYN CLINIC | Facility: CLINIC | Age: 49
End: 2024-06-10

## 2024-06-10 NOTE — TELEPHONE ENCOUNTER
----- Message from OTILIA Santizo sent at 6/7/2024 11:34 AM EDT -----  Please inform pt that her pap and HPV were both negative.  Thank You.

## 2024-06-10 NOTE — TELEPHONE ENCOUNTER
ID [id number]    Called and spoke to patient, informed her of the test results. Patient verbalized understanding, no questions or concerns.

## 2024-06-11 ENCOUNTER — OFFICE VISIT (OUTPATIENT)
Dept: OBGYN CLINIC | Facility: CLINIC | Age: 49
End: 2024-06-11

## 2024-06-11 VITALS
SYSTOLIC BLOOD PRESSURE: 140 MMHG | HEART RATE: 76 BPM | BODY MASS INDEX: 30.36 KG/M2 | WEIGHT: 165 LBS | RESPIRATION RATE: 18 BRPM | HEIGHT: 62 IN | DIASTOLIC BLOOD PRESSURE: 79 MMHG

## 2024-06-11 DIAGNOSIS — N89.8 VAGINAL DISCHARGE: ICD-10-CM

## 2024-06-11 DIAGNOSIS — B37.31 YEAST INFECTION OF THE VAGINA: ICD-10-CM

## 2024-06-11 DIAGNOSIS — R74.01 TRANSAMINITIS: ICD-10-CM

## 2024-06-11 DIAGNOSIS — L90.0 LICHEN SCLEROSUS: Primary | ICD-10-CM

## 2024-06-11 DIAGNOSIS — R39.9 UTI SYMPTOMS: ICD-10-CM

## 2024-06-11 LAB
SL AMB  POCT GLUCOSE, UA: NORMAL
SL AMB LEUKOCYTE ESTERASE,UA: NORMAL
SL AMB POCT BILIRUBIN,UA: NORMAL
SL AMB POCT BLOOD,UA: NORMAL
SL AMB POCT CLARITY,UA: CLEAR
SL AMB POCT COLOR,UA: YELLOW
SL AMB POCT KETONES,UA: NORMAL
SL AMB POCT NITRITE,UA: NORMAL
SL AMB POCT PH,UA: 6
SL AMB POCT SPECIFIC GRAVITY,UA: 1.01
SL AMB POCT URINE PROTEIN: NORMAL
SL AMB POCT UROBILINOGEN: 0.2

## 2024-06-11 PROCEDURE — 99213 OFFICE O/P EST LOW 20 MIN: CPT | Performed by: STUDENT IN AN ORGANIZED HEALTH CARE EDUCATION/TRAINING PROGRAM

## 2024-06-11 PROCEDURE — 81003 URINALYSIS AUTO W/O SCOPE: CPT | Performed by: STUDENT IN AN ORGANIZED HEALTH CARE EDUCATION/TRAINING PROGRAM

## 2024-06-11 PROCEDURE — 87210 SMEAR WET MOUNT SALINE/INK: CPT | Performed by: STUDENT IN AN ORGANIZED HEALTH CARE EDUCATION/TRAINING PROGRAM

## 2024-06-11 RX ORDER — FLUCONAZOLE 150 MG/1
150 TABLET ORAL ONCE
Qty: 1 TABLET | Refills: 0 | Status: SHIPPED | OUTPATIENT
Start: 2024-06-11 | End: 2024-06-11

## 2024-06-11 NOTE — PATIENT INSTRUCTIONS
Liquen escleroso   CUIDADO AMBULATORIO:   El liquen escleroso es jorge alberto condición de la piel que afecta con más frecuencia la vulva, el pene o la piel alrededor del ano. El liquen escleroso se produce más a menudo en las mujeres. Es posible que no se conozca la causa del liquen escleroso.  Los síntomas más comunes incluyen los siguientes: Es probable que usted no tenga ningún síntoma o tenga cualquiera de los siguientes:  Dolor, picazón o ardor    Zonas de piel kerrie, arrugada y veronica    Ampollas    Sangrado, moretones o desgarros en la piel afectada    Comuníquese con waldrop médico si:  Egri síntomas no mejoran con el tratamiento.    Usted tiene preguntas o inquietudes acerca de waldrop condición o cuidado.    Tratamientos y cuidados: No necesita ningún tratamiento si no presenta síntomas. Waldrop médico puede recomendarle jorge alberto crema o ungüento con esteroides. Waldrop médico también puede recomendarle un medicamento tópico que impide que waldrop sistema inmunitario ataque la piel. Si usted es katerine, waldrop médico puede recomendarle que no tome [place] de burbujas, ni use jabones y detergentes perfumados. [place] puede ayudar a disminuir la irritación de la vulva. En raras ocasiones, los adultos pueden necesitar jorge alberto cirugía para reparar la cicatrización que puede ocurrir con el paso del tiempo.  Acuda a la consulta de control con waldrop médico según las indicaciones: Anote geri preguntas para que se acuerde de hacerlas pan geri visitas.  © Copyright Merative 2023 Information is for End User's use only and may not be sold, redistributed or otherwise used for commercial purposes.  Esta información es sólo para uso en educación. Waldrop intención no es darle un consejo médico sobre enfermedades o tratamientos. Colsulte con waldrop médico, enfermera o farmacéutico antes de seguir cualquier régimen médico para saber si es seguro y efectivo para usted.

## 2024-06-11 NOTE — PROGRESS NOTES
Ambulatory Visit  Name: Sahra Kimbrough      : 1975      MRN: [id number]  Encounter Provider: Resident JAZMIN Jiménez  Encounter Date: 2024   Encounter department: [facility]    Assessment & Plan   1. Lichen sclerosus  2. Yeast infection of the vagina  -     fluconazole (DIFLUCAN) 150 mg tablet; Take 1 tablet (150 mg total) by mouth once for 1 dose  3. Vaginal discharge  -     POCT wet mount  4. Transaminitis  -     Ambulatory Referral to Family Practice; Future  5. UTI symptoms  -     POCT urine dip auto non-scope     Patient presents to clinic for concerns of vaginal dryness and irritation with associated itching that has not been resolved despite product changes and cleaning.  She reports she previously had vaginal itching with associated bleeding for which she was seen in the ED and treated for a UTI.  Of note, patient's urine dip today in clinic notable for glucose and ketones, negative for nitrites, leukocytes, or RBCs.  Wet mount notable for scarce budding.  Rx sent for diflucan to patient's preferred pharmacy, along with counseling to decrease frequency of cleaning/douching in the vagina.     Patient also with diffuse vulvar atrophy and vaginal dryness/tenderness on exam concerning for lichen sclerosis.  Recommended patient trial coconut oil or vitamin E oil to help with dryness symptoms.  If patient's symptoms do not resolve with topical OTC agent application and diflucan treatment, would recommend vulvar biopsy of lichen sclerosis and potential initiation of clobetasol.  Also discussed patient concern of previously elevated alkaline phosphatase on labs and recommended establishing with a primary care physician for additional workup.     Return in about 2 weeks (around 2024), or if symptoms worsen or fail to improve.    History of Present Illness      ID [id number]    Sahra presents to clinic today for evaluation of vaginal irritation and discharge  "for which she states she went to the ER many months ago and was given medication.  She previously went to the ER for bleeding with discharge (not current).  She also has issues with toilet paper getting stuck in the vulvar area despite trying different brands of toilet paper and wipes.  She is having some burning and itching despite frequent cleaning including vaginal washing and douching and describes she feels like she has a \"paste\" coating the vulvar area that she cannot wash off.  She is unable to characterize the discharge.     Vaginal Discharge  The patient's primary symptoms include genital itching and vaginal discharge. The patient's pertinent negatives include no genital rash or vaginal bleeding. This is a chronic problem. The current episode started more than 1 month ago. The problem has been waxing and waning. Associated symptoms include dysuria. Pertinent negatives include no hematuria or urgency. Vaginal discharge characteristics: unable to characterize. There has been no bleeding.     Review of Systems   Genitourinary:  Positive for dysuria and vaginal discharge. Negative for hematuria, urgency, vaginal bleeding and vaginal pain.        Vulvar itching     Past Medical History:   Diagnosis Date    Diabetes mellitus (HCC)      History reviewed. No pertinent surgical history.  Family History   Problem Relation Age of Onset    No Known Problems Mother     Cancer Father     No Known Problems Sister     No Known Problems Sister     No Known Problems Brother     No Known Problems Brother     No Known Problems Brother     Asthma Daughter     Depression Daughter     No Known Problems Daughter     Breast cancer Neg Hx     Colon cancer Neg Hx     Ovarian cancer Neg Hx      Social History     Tobacco Use    Smoking status: Never    Smokeless tobacco: Never   Vaping Use    Vaping status: Never Used   Substance and Sexual Activity    Alcohol use: Yes     Comment: socially    Drug use: Never    Sexual activity: Not " "Currently     Current Outpatient Medications on File Prior to Visit   Medication Sig    clobetasol (TEMOVATE) 0.05 % ointment Use Clobetasol propionate 0.05% use nightly for 4 weeks, followed by alternating nights for 4 weeks, an then twice weekly for 4 weeks    omeprazole (PriLOSEC) 20 mg delayed release capsule Take 1 capsule (20 mg total) by mouth daily    acetaminophen (TYLENOL) 500 mg tablet Take 2 tablets (1,000 mg total) by mouth every 6 (six) hours as needed for mild pain for up to 20 doses (Patient not taking: Reported on 5/2/2024)    fluticasone (FLONASE) 50 mcg/act nasal spray 2 sprays into each nostril daily (Patient not taking: Reported on 5/2/2024)    metFORMIN (GLUCOPHAGE) 500 mg tablet Take 1 tablet (500 mg total) by mouth 2 (two) times a day with meals for 21 days    naproxen (Naprosyn) 500 mg tablet Take 1 tablet (500 mg total) by mouth 2 (two) times a day with meals (Patient not taking: Reported on 5/28/2024)     No Known Allergies    There is no immunization history on file for this patient.  Objective     /79 (BP Location: Right arm, Patient Position: Sitting, Cuff Size: Adult)   Pulse 76   Resp 18   Ht 5' 2\" (1.575 m)   Wt 74.8 kg (165 lb)   LMP 04/30/2024 (Approximate)   BMI 30.18 kg/m²     Physical Exam  Exam conducted with a chaperone present.   Genitourinary:     Exam position: Supine.      Comments: External vulvar area is red and raw in appearance  Diffuse vulvar atrophy  Significant discomfort with examination  Vaginal canal is noted to be dry  No significant discharge is appreciated on examination    Anika Garcia DO  "

## 2024-06-12 LAB
BV WHIFF TEST VAG QL: NEGATIVE
CLUE CELLS SPEC QL WET PREP: NEGATIVE
PH SMN: 5.5 [PH]
SL AMB POCT WET MOUNT: POSITIVE
T VAGINALIS VAG QL WET PREP: NEGATIVE
YEAST VAG QL WET PREP: POSITIVE

## 2024-07-11 ENCOUNTER — ANESTHESIA EVENT (OUTPATIENT)
Dept: GASTROENTEROLOGY | Facility: HOSPITAL | Age: 49
End: 2024-07-11

## 2024-07-11 ENCOUNTER — HOSPITAL ENCOUNTER (OUTPATIENT)
Dept: GASTROENTEROLOGY | Facility: HOSPITAL | Age: 49
Setting detail: OUTPATIENT SURGERY
End: 2024-07-11
Payer: COMMERCIAL

## 2024-07-11 ENCOUNTER — TELEPHONE (OUTPATIENT)
Dept: GASTROENTEROLOGY | Facility: CLINIC | Age: 49
End: 2024-07-11

## 2024-07-11 ENCOUNTER — ANESTHESIA (OUTPATIENT)
Dept: GASTROENTEROLOGY | Facility: HOSPITAL | Age: 49
End: 2024-07-11

## 2024-07-11 VITALS
HEART RATE: 83 BPM | WEIGHT: 166.4 LBS | RESPIRATION RATE: 16 BRPM | DIASTOLIC BLOOD PRESSURE: 54 MMHG | HEIGHT: 62 IN | OXYGEN SATURATION: 98 % | TEMPERATURE: 97.2 F | SYSTOLIC BLOOD PRESSURE: 111 MMHG | BODY MASS INDEX: 30.62 KG/M2

## 2024-07-11 DIAGNOSIS — R10.13 EPIGASTRIC PAIN: ICD-10-CM

## 2024-07-11 DIAGNOSIS — Z87.19 HISTORY OF BLOODY STOOLS: ICD-10-CM

## 2024-07-11 PROBLEM — D50.9 IRON DEFICIENCY ANEMIA: Status: ACTIVE | Noted: 2019-03-28

## 2024-07-11 PROBLEM — N93.9 ABNORMAL UTERINE BLEEDING (AUB): Status: ACTIVE | Noted: 2020-08-26

## 2024-07-11 PROBLEM — E78.5 HYPERLIPIDEMIA: Status: ACTIVE | Noted: 2019-03-28

## 2024-07-11 LAB
B-HCG SERPL-ACNC: 0.9 MIU/ML (ref 0–5)
GLUCOSE SERPL-MCNC: 286 MG/DL (ref 65–140)

## 2024-07-11 PROCEDURE — 82948 REAGENT STRIP/BLOOD GLUCOSE: CPT

## 2024-07-11 PROCEDURE — 88305 TISSUE EXAM BY PATHOLOGIST: CPT | Performed by: STUDENT IN AN ORGANIZED HEALTH CARE EDUCATION/TRAINING PROGRAM

## 2024-07-11 PROCEDURE — G0121 COLON CA SCRN NOT HI RSK IND: HCPCS | Performed by: INTERNAL MEDICINE

## 2024-07-11 PROCEDURE — 84702 CHORIONIC GONADOTROPIN TEST: CPT | Performed by: STUDENT IN AN ORGANIZED HEALTH CARE EDUCATION/TRAINING PROGRAM

## 2024-07-11 PROCEDURE — 43239 EGD BIOPSY SINGLE/MULTIPLE: CPT | Performed by: INTERNAL MEDICINE

## 2024-07-11 RX ORDER — SODIUM CHLORIDE, SODIUM LACTATE, POTASSIUM CHLORIDE, CALCIUM CHLORIDE 600; 310; 30; 20 MG/100ML; MG/100ML; MG/100ML; MG/100ML
INJECTION, SOLUTION INTRAVENOUS CONTINUOUS PRN
Status: DISCONTINUED | OUTPATIENT
Start: 2024-07-11 | End: 2024-07-11

## 2024-07-11 RX ORDER — LIDOCAINE HCL/PF 100 MG/5ML
SYRINGE (ML) INJECTION AS NEEDED
Status: DISCONTINUED | OUTPATIENT
Start: 2024-07-11 | End: 2024-07-11

## 2024-07-11 RX ORDER — PROPOFOL 10 MG/ML
INJECTION, EMULSION INTRAVENOUS AS NEEDED
Status: DISCONTINUED | OUTPATIENT
Start: 2024-07-11 | End: 2024-07-11

## 2024-07-11 RX ORDER — GLYCOPYRROLATE 0.2 MG/ML
INJECTION INTRAMUSCULAR; INTRAVENOUS AS NEEDED
Status: DISCONTINUED | OUTPATIENT
Start: 2024-07-11 | End: 2024-07-11

## 2024-07-11 RX ADMIN — GLYCOPYRROLATE 0.2 MG: 0.2 INJECTION, SOLUTION INTRAMUSCULAR; INTRAVENOUS at 08:47

## 2024-07-11 RX ADMIN — PROPOFOL 50 MG: 10 INJECTION, EMULSION INTRAVENOUS at 08:51

## 2024-07-11 RX ADMIN — PROPOFOL 50 MG: 10 INJECTION, EMULSION INTRAVENOUS at 09:00

## 2024-07-11 RX ADMIN — PROPOFOL 50 MG: 10 INJECTION, EMULSION INTRAVENOUS at 08:48

## 2024-07-11 RX ADMIN — PROPOFOL 50 MG: 10 INJECTION, EMULSION INTRAVENOUS at 09:05

## 2024-07-11 RX ADMIN — PROPOFOL 150 MG: 10 INJECTION, EMULSION INTRAVENOUS at 08:47

## 2024-07-11 RX ADMIN — LIDOCAINE HYDROCHLORIDE 100 MG: 20 INJECTION INTRAVENOUS at 08:47

## 2024-07-11 RX ADMIN — PROPOFOL 50 MG: 10 INJECTION, EMULSION INTRAVENOUS at 08:56

## 2024-07-11 RX ADMIN — SODIUM CHLORIDE, SODIUM LACTATE, POTASSIUM CHLORIDE, AND CALCIUM CHLORIDE: .6; .31; .03; .02 INJECTION, SOLUTION INTRAVENOUS at 08:20

## 2024-07-11 NOTE — ANESTHESIA POSTPROCEDURE EVALUATION
Post-Op Assessment Note    CV Status:  Stable  Pain Score: 0    Pain management: adequate       Mental Status:  Alert and awake   Hydration Status:  Euvolemic and stable   PONV Controlled:  None   Airway Patency:  Patent     Post Op Vitals Reviewed: Yes    No anethesia notable event occurred.    Staff: CRNA               BP 92/53 (07/11/24 0909)    Temp      Pulse 84 (07/11/24 0909)   Resp 18 (07/11/24 0909)    SpO2 99 % (07/11/24 0909)

## 2024-07-11 NOTE — TELEPHONE ENCOUNTER
----- Message from Imtiaz Merritt MD sent at 7/11/2024  9:14 AM EDT -----  Call patient to schedule colonoscopy in 3 months GoLytely prep, but suboptimal prep today.  Patient speaks little English, may want to talk to her daughter.  Thank you

## 2024-07-11 NOTE — ANESTHESIA PREPROCEDURE EVALUATION
Procedure:  COLONOSCOPY  EGD    Relevant Problems   CARDIO   (+) Hyperlipidemia      ENDO   (+) Type 2 diabetes mellitus without complication, without long-term current use of insulin (HCC)      HEMATOLOGY   (+) Iron deficiency anemia      Endocrine   (+) Thyroid nodule      Obstetrics/Gynecology   (+) Abnormal uterine bleeding (AUB)      Neurology/Sleep   (+) Pelvic and perineal pain      Urinary   (+) Urinary tract infection      Care Coordination   (+) Language barrier      Eye   (+) Constant exophthalmos      Other   (+) Lichen sclerosus   (+) Obesity      Lab Results   Component Value Date     05/14/2014    SODIUM 134 (L) 04/27/2024    K 3.6 04/27/2024    CL 99 04/27/2024    CO2 25 04/27/2024    ANIONGAP 9 05/14/2014    AGAP 10 04/27/2024    BUN 15 04/27/2024    CREATININE 0.57 (L) 04/27/2024    GLUC 370 (H) 04/27/2024    CALCIUM 9.5 04/27/2024    AST 14 05/02/2024    ALT 19 05/02/2024    ALKPHOS 183 (H) 05/02/2024    PROT 7.6 05/14/2014    TP 6.4 05/02/2024    BILITOT 0.7 05/14/2014    TBILI 0.79 05/02/2024    EGFR 108 04/27/2024     Lab Results   Component Value Date    WBC 6.59 04/27/2024    HGB 14.5 04/27/2024    HCT 40.7 04/27/2024    MCV 84 04/27/2024     04/27/2024         Physical Exam    Airway    Mallampati score: II  TM Distance: >3 FB  Neck ROM: full     Dental       Cardiovascular      Pulmonary      Other Findings  post-pubertal.      Anesthesia Plan  ASA Score- 2     Anesthesia Type- IV sedation with anesthesia with ASA Monitors.         Additional Monitors:     Airway Plan:            Plan Factors-Exercise tolerance (METS): >4 METS.    Chart reviewed. EKG reviewed. Imaging results reviewed. Existing labs reviewed. Patient summary reviewed.                  Induction- intravenous.    Postoperative Plan-         Informed Consent- Anesthetic plan and risks discussed with patient.  I personally reviewed this patient with the CRNA. Discussed and agreed on the Anesthesia Plan with the  CRNA..

## 2024-07-11 NOTE — H&P
History and Physical -  [facility]  Sahra Kimbrough 48 y.o. female MRN: [id number]                  HPI: Sahra Kimbrough is a 48 y.o. year old female who presents for h/o gerd screning       REVIEW OF SYSTEMS: Per the HPI, and otherwise unremarkable.    Historical Information   Past Medical History:   Diagnosis Date    Diabetes mellitus (HCC)      History reviewed. No pertinent surgical history.  Social History   Social History     Substance and Sexual Activity   Alcohol Use Yes    Comment: socially     Social History     Substance and Sexual Activity   Drug Use Never     Social History     Tobacco Use   Smoking Status Never   Smokeless Tobacco Never     Family History   Problem Relation Age of Onset    No Known Problems Mother     Cancer Father     No Known Problems Sister     No Known Problems Sister     No Known Problems Brother     No Known Problems Brother     No Known Problems Brother     Asthma Daughter     Depression Daughter     No Known Problems Daughter     Breast cancer Neg Hx     Colon cancer Neg Hx     Ovarian cancer Neg Hx        Meds/Allergies       Current Outpatient Medications:     acetaminophen (TYLENOL) 500 mg tablet    clobetasol (TEMOVATE) 0.05 % ointment    fluticasone (FLONASE) 50 mcg/act nasal spray    metFORMIN (GLUCOPHAGE) 500 mg tablet    naproxen (Naprosyn) 500 mg tablet    omeprazole (PriLOSEC) 20 mg delayed release capsule    No Known Allergies    Objective     LMP 07/10/2024       PHYSICAL EXAM    Gen: NAD  Head: NCAT  CV: RRR  CHEST: Clear  ABD: soft, NT/ND  EXT: no edema      ASSESSMENT/PLAN:  This is a 48 y.o. year old female here for egd colon, and she is stable and optimized for her procedure.

## 2024-07-15 PROCEDURE — 88305 TISSUE EXAM BY PATHOLOGIST: CPT | Performed by: STUDENT IN AN ORGANIZED HEALTH CARE EDUCATION/TRAINING PROGRAM

## 2024-07-15 NOTE — TELEPHONE ENCOUNTER
I lmom with Frieda, daughter to please call me back to schedule pt for a 3 mo repeat colonoscopy with Dr Merritt. Will call again if do not hear back from her.

## 2024-07-15 NOTE — RESULT ENCOUNTER NOTE
Please call the patient regarding her abnormal result.  EGD biopsy mild inflammation.  Call our office to schedule colonoscopy because her last exam was very poor prep and incomplete exam.  Follow up in my office as needed

## 2024-07-16 ENCOUNTER — TELEPHONE (OUTPATIENT)
Dept: GASTROENTEROLOGY | Facility: CLINIC | Age: 49
End: 2024-07-16

## 2024-07-16 NOTE — TELEPHONE ENCOUNTER
----- Message from Alisson MERIDA sent at 7/16/2024  1:48 PM EDT -----  Patients daughter aware and educated. Please addend colon report to states colon recall 3 months not 6 months.  Please call to schedule recall colon 3 months -poor prep/incomplete exam. Thank you

## 2024-07-24 ENCOUNTER — PREP FOR PROCEDURE (OUTPATIENT)
Age: 49
End: 2024-07-24

## 2024-07-24 DIAGNOSIS — Z12.11 SCREENING FOR COLON CANCER: Primary | ICD-10-CM

## 2024-07-24 DIAGNOSIS — Z12.11 ENCOUNTER FOR SCREENING COLONOSCOPY: ICD-10-CM

## 2024-07-24 DIAGNOSIS — Z53.8 PROCEDURE NOT CARRIED OUT FOR OTHER REASONS: Primary | ICD-10-CM

## 2024-08-07 ENCOUNTER — CONSULT (OUTPATIENT)
Dept: ENDOCRINOLOGY | Facility: CLINIC | Age: 49
End: 2024-08-07

## 2024-08-07 VITALS
SYSTOLIC BLOOD PRESSURE: 122 MMHG | OXYGEN SATURATION: 98 % | BODY MASS INDEX: 31.47 KG/M2 | RESPIRATION RATE: 16 BRPM | DIASTOLIC BLOOD PRESSURE: 86 MMHG | WEIGHT: 171 LBS | TEMPERATURE: 97.9 F | HEIGHT: 62 IN | HEART RATE: 70 BPM

## 2024-08-07 DIAGNOSIS — E66.9 CLASS 1 OBESITY WITHOUT SERIOUS COMORBIDITY WITH BODY MASS INDEX (BMI) OF 31.0 TO 31.9 IN ADULT, UNSPECIFIED OBESITY TYPE: ICD-10-CM

## 2024-08-07 DIAGNOSIS — R63.4 WEIGHT LOSS: ICD-10-CM

## 2024-08-07 DIAGNOSIS — H05.20 EXOPHTHALMOS: ICD-10-CM

## 2024-08-07 DIAGNOSIS — E78.2 MIXED HYPERLIPIDEMIA: ICD-10-CM

## 2024-08-07 DIAGNOSIS — E04.1 THYROID NODULE: ICD-10-CM

## 2024-08-07 DIAGNOSIS — R73.9 HYPERGLYCEMIA: ICD-10-CM

## 2024-08-07 DIAGNOSIS — E11.65 TYPE 2 DIABETES MELLITUS WITH HYPERGLYCEMIA, WITHOUT LONG-TERM CURRENT USE OF INSULIN (HCC): Primary | ICD-10-CM

## 2024-08-07 PROCEDURE — 99244 OFF/OP CNSLTJ NEW/EST MOD 40: CPT | Performed by: INTERNAL MEDICINE

## 2024-08-07 RX ORDER — INSULIN LISPRO 100 [IU]/ML
10 INJECTION, SOLUTION INTRAVENOUS; SUBCUTANEOUS
Qty: 30 ML | Refills: 1 | Status: SHIPPED | OUTPATIENT
Start: 2024-08-07

## 2024-08-07 RX ORDER — INSULIN GLARGINE 100 [IU]/ML
24 INJECTION, SOLUTION SUBCUTANEOUS
Qty: 24 ML | Refills: 1 | Status: SHIPPED | OUTPATIENT
Start: 2024-08-07

## 2024-08-07 RX ORDER — KETOROLAC TROMETHAMINE 30 MG/ML
1 INJECTION, SOLUTION INTRAMUSCULAR; INTRAVENOUS CONTINUOUS
Qty: 1 EACH | Refills: 0 | Status: SHIPPED | OUTPATIENT
Start: 2024-08-07

## 2024-08-07 RX ORDER — BLOOD-GLUCOSE SENSOR
1 EACH MISCELLANEOUS
Qty: 2 EACH | Refills: 5 | Status: SHIPPED | OUTPATIENT
Start: 2024-08-07

## 2024-08-07 NOTE — ASSESSMENT & PLAN NOTE
She is poorly controlled.    Today we discussed all aspects of diabetes including pathophysiology, risk factors, complications, SAGM, CGM, diet, lifestyle modifications, medical fitness training, diabetes education, goals of therapy, follow up needs and medications including insulin, metformin, Jardiance and GLP1 agonists.  Advised to maintain goal blood sugars 70-180mg/dL.    Will start basal bolus insulin therapy as listed.  Start personal CGM for monitoring hypoglycemia given MDI insulin therapy.    Follow up in 6 weeks.      Lab Results   Component Value Date    HGBA1C 13.1 (H) 05/03/2024

## 2024-08-07 NOTE — PROGRESS NOTES
"    New Consult Note      CC: Type 2 diabetes    History of Present Illness:   48 yr female with Hx GDM age 30, Type 2 diabetes , Vaginal yeast infections,  HTN. HLD, vit D deficiency.    She was evaluated in ER 4/24 with sx of UTI and hyperglycemia.    SAGM:    Current meds:  Metformin 1000mg po bid    Opthamology: yes  Podiatry:   vaccination:   Dental:  Pancreatitis: no    Ace/ARB:   Statin:    Thyroid issues:  5/15/24 US thyroid. Homogenous gland.  4mm colloid cyst lt thyroid lobe.      Physical Exam:  Body mass index is 31.28 kg/m².  /86 (BP Location: Right arm, Patient Position: Sitting)   Pulse 70   Temp 97.9 °F (36.6 °C) (Tympanic)   Resp 16   Ht 5' 2\" (1.575 m)   Wt 77.6 kg (171 lb)   LMP 07/10/2024   SpO2 98%   BMI 31.28 kg/m²    Vitals:    08/07/24 1444   Weight: 77.6 kg (171 lb)        Physical Exam  Constitutional:       General: She is not in acute distress.     Appearance: She is well-developed.   HENT:      Head: Normocephalic and atraumatic.      Nose: Nose normal.   Eyes:      Conjunctiva/sclera: Conjunctivae normal.   Pulmonary:      Effort: Pulmonary effort is normal.   Abdominal:      General: There is no distension.   Musculoskeletal:      Cervical back: Normal range of motion and neck supple.   Skin:     Findings: No rash.      Comments: No icterus   Neurological:      Mental Status: She is alert and oriented to person, place, and time.         Labs:   Lab Results   Component Value Date    HGBA1C 13.1 (H) 05/03/2024       Lab Results   Component Value Date    DXL0NDTIMQKK 1.680 05/03/2024    TSH 1.50 11/07/2020       Lab Results   Component Value Date    CREATININE 0.57 (L) 04/27/2024    CREATININE 0.54 (L) 05/27/2023    CREATININE 0.71 09/30/2020    BUN 15 04/27/2024     05/14/2014    K 3.6 04/27/2024    CL 99 04/27/2024    CO2 25 04/27/2024     GFR, Calculated   Date Value Ref Range Status   09/30/2020 102 >60 mL/min/1.73m2 Final     Comment:     mL/min per 1.73 square " "meters                                            Normal Function or Mild Renal    Disease (if clinically at risk):  >or=60  Moderately Decreased:                30-59  Severely Decreased:                  15-29  Renal Failure:                         <15                                            -American GFR: multiply reported GFR by 1.16    Please note that the eGFR is based on the CKD-EPI calculation, and is not intended to be used for drug dosing.                                            Note: Calculated GFR may not be an accurate indicator of renal function if the patient's renal function is not in a steady state.     eGFR   Date Value Ref Range Status   04/27/2024 108 ml/min/1.73sq m Final       Lab Results   Component Value Date    ALT 19 05/02/2024    AST 14 05/02/2024    GGT 20 05/02/2024    ALKPHOS 183 (H) 05/02/2024    BILITOT 0.7 05/14/2014       No results found for: \"CHOLESTEROL\"  No results found for: \"HDL\"  No results found for: \"TRIG\"  No results found for: \"NONHDLC\"      Assessment/Plan:    1. Type 2 diabetes mellitus with hyperglycemia, without long-term current use of insulin ([facility])  Assessment & Plan:  She is poorly controlled.    Today we discussed all aspects of diabetes including pathophysiology, risk factors, complications, SAGM, CGM, diet, lifestyle modifications, medical fitness training, diabetes education, goals of therapy, follow up needs and medications including insulin, metformin, Jardiance and GLP1 agonists.  Advised to maintain goal blood sugars 70-180mg/dL.    Will start basal bolus insulin therapy as listed.  Start personal CGM for monitoring hypoglycemia given MDI insulin therapy.    Follow up in 6 weeks.      Lab Results   Component Value Date    HGBA1C 13.1 (H) 05/03/2024     Orders:  -     Hemoglobin A1C; Future  -     Albumin / creatinine urine ratio; Future  -     Ambulatory referral to Diabetic Education; Future  -     C-peptide; Future  -     Continuous Glucose " Sensor (FreeStyle Dee Dee 3 Sensor) MISC; Use 1 Units every 14 (fourteen) days  -     Continuous Glucose  (FreeStyle Dee Dee 3 Oran) TIFFANY; Use 1 each continuous  -     Insulin Glargine Solostar (Lantus SoloStar) 100 UNIT/ML SOPN; Inject 0.24 mL (24 Units total) under the skin daily at bedtime  -     insulin lispro (Admelog SoloStar) 100 units/mL injection pen; Inject 10 Units under the skin 3 (three) times a day with meals  -     Ambulatory Referral to Ophthalmology; Future  2. Hyperglycemia  -     Ambulatory Referral to Endocrinology  -     metFORMIN (GLUCOPHAGE) 500 mg tablet; Take 1 tablet (500 mg total) by mouth 2 (two) times a day with meals  3. Thyroid nodule  Assessment & Plan:  She has a colloid cyst. No further follow up is needed.  4. Mixed hyperlipidemia  5. Class 1 obesity without serious comorbidity with body mass index (BMI) of 31.0 to 31.9 in adult, unspecified obesity type  6. Weight loss  -     T4, free; Future  -     TSH, 3rd generation; Future  7. Exophthalmos  Assessment & Plan:  Rule out Graves ophthalmopathy  Orders:  -     Thyroid stimulating immunoglobulin; Future        I have spent a total time of 40 minutes on 08/07/24 in caring for this patient including greater than 50% of this time was spent in counseling/coordination of care as listed above.       Discussed with the patient and all questioned fully answered. She will contact me with concerns.    Simona Lyn

## 2024-08-16 ENCOUNTER — TELEPHONE (OUTPATIENT)
Dept: ENDOCRINOLOGY | Facility: CLINIC | Age: 49
End: 2024-08-16

## 2024-08-16 NOTE — TELEPHONE ENCOUNTER
Patient Satisfaction Call    [facility] for patient post office visit to go over any questions or concerns they may have. Informed patient they could call [phone] to clarify any concerns.

## 2024-10-14 RX ORDER — LIDOCAINE HYDROCHLORIDE 10 MG/ML
0.5 INJECTION, SOLUTION EPIDURAL; INFILTRATION; INTRACAUDAL; PERINEURAL ONCE AS NEEDED
OUTPATIENT
Start: 2024-10-14

## 2024-10-14 RX ORDER — SODIUM CHLORIDE, SODIUM LACTATE, POTASSIUM CHLORIDE, CALCIUM CHLORIDE 600; 310; 30; 20 MG/100ML; MG/100ML; MG/100ML; MG/100ML
125 INJECTION, SOLUTION INTRAVENOUS CONTINUOUS
OUTPATIENT
Start: 2024-10-14

## 2024-11-29 NOTE — PROGRESS NOTES
Assessment/Plan:    Lichen sclerosus  Intense vaginal itching, clinical exam significant for white shiny plaque rash surround vaginal opening, volvar region and expands into gluteal cleft, supporting lichen sclerosus   History of diabetes  Wet mount negative    Plan:  Start Clobetasol ointment nightly for 4 weeks, followed by alternating nights for 4 weeks followed by twice per week for 4 weeks  Follow up in 3 months for recheck of Lichen sclerosus   F/u G/C swab    Thyroid nodule  Clinical exam shows: exophthalmos, right thyroidmegaly with palpable thyroid nodule  F/u TSH and Thyroid US  Follow up with Endo on 2024  Follow up with PCP- referral made to [facility]    Type 2 diabetes mellitus without complication, without long-term current use of insulin ([facility])    Lab Results   Component Value Date    HGBA1C 6.7 (H) 2020   3+ glucose in urine with elevated Glucose levels in ED  Continue Metformin  F/u HbgA1c  Follow up with Endo on   Establish with PCP        Diagnoses and all orders for this visit:    Lichen sclerosus  -     clobetasol (TEMOVATE) 0.05 % ointment; Use Clobetasol propionate 0.05% use nightly for 4 weeks, followed by alternating nights for 4 weeks, an then twice weekly for 4 weeks    Type 2 diabetes mellitus without complication, without long-term current use of insulin ([facility])  -     Hemoglobin A1C; Future    Possible exposure to STD  -     Chlamydia/GC amplified DNA by PCR    Thyroid nodule  -     TSH, 3rd generation with Free T4 reflex; Future  -     US thyroid; Future    Vaginal itching  -     POCT wet mount          Subjective:      Patient ID: Sahra Kimbrough is a 48 y.o. female.    47 yo  with PMH hysteroscopic resection of cervical fibroid, diabetes presents for ED follow. Patient was evaluated at ED on - for lower abdominal pain, dysuria, vaginal itching, and pain with bowel movements. In the ED, clinical exam was positive for vaginal inflammation  "extending to the gluteal folds and hemorrhoids. UA was positive for trace leukocytes, 3+ glucose, 3+ blood, innumerable RBC, innumerable WBC, and moderate bacteria. Urine cultures positive for E. Coli. Fecal Occult was positive for blood, Hbg 14.5. She was started on Macrobid 100mg BID for 5 days for UTI, given Topical Clotrimazole and Betamethasone cream for volvar rash, started on metformin for uncontrolled diabetes, and given GI referral for GI bleeding.       She saw GI yesterday who recommended high fiber diet, Omeprazole 20mg, EGD/colonoscopy.     Since ED visit, she reports finished macrobid yesterday. But is still having intense itching. Reports dysuria has resolved. Reports using cream in vaginal area. No suprapubic tenderness, fevers, or vaginal discharge.        The following portions of the patient's history were reviewed and updated as appropriate: allergies, current medications, past family history, past medical history, past social history, past surgical history, and problem list.    Review of Systems   Genitourinary:  Positive for vaginal pain (itching). Negative for dysuria, genital sores, hematuria, vaginal bleeding and vaginal discharge.   Skin:  Positive for rash.         Objective:      /75 (BP Location: Right arm, Patient Position: Sitting, Cuff Size: Adult)   Pulse 84   Resp 18   Ht 5' 2\" (1.575 m)   Wt 77.1 kg (170 lb)   LMP  (LMP Unknown)   BMI 31.09 kg/m²          Physical Exam  Vitals and nursing note reviewed. Exam conducted with a chaperone present.   Constitutional:       Appearance: Normal appearance.   HENT:      Head: Normocephalic.   Eyes:      Comments: exophthalmos   Neck:      Comments: Right Thyroidmegaly with thyroid nodule  Cardiovascular:      Rate and Rhythm: Normal rate and regular rhythm.      Pulses: Normal pulses.      Heart sounds: Normal heart sounds. No murmur heard.     No gallop.   Pulmonary:      Effort: Pulmonary effort is normal. No respiratory " distress.      Breath sounds: Normal breath sounds. No wheezing, rhonchi or rales.   Abdominal:      General: Abdomen is flat. Bowel sounds are normal. There is no distension.      Palpations: Abdomen is soft. There is no mass.      Tenderness: There is no abdominal tenderness. There is no guarding or rebound.   Genitourinary:     Pubic Area: No rash (lichen sclerosus) or pubic lice.       Labia:         Right: Rash and tenderness present. No lesion.         Left: Rash and tenderness present. No lesion.       Vagina: No vaginal discharge or lesions.      Comments: lichen sclerosus  No vaginal discharge, dry atrophic vaginal mucosa   Musculoskeletal:      Right lower leg: No edema.      Left lower leg: No edema.   Skin:     General: Skin is warm and dry.      Findings: Rash (white, shiny, rash in volvar area extending down into gleutal cleft and around anus) present.   Neurological:      Mental Status: She is alert.   Psychiatric:         Mood and Affect: Mood normal.         Behavior: Behavior normal.                never

## 2025-01-20 ENCOUNTER — TELEPHONE (OUTPATIENT)
Dept: OTHER | Facility: OTHER | Age: 50
End: 2025-01-20

## 2025-01-20 NOTE — TELEPHONE ENCOUNTER
Progress Note:      Mammogram Screening (Hospital[facility]): [facility]  Pap smear screening (Clinic vs [facility] vs [facility]):  PCP ([facility]):     Transportation:     Education:Patient rescheduled for her mammogram and educated on the importance of routine screenings.

## 2025-01-20 NOTE — TELEPHONE ENCOUNTER
Progress Note:      Mammogram Screening (Hospital/[facility]):  Pap smear screening (Clinic vs [facility] vs [facility]):  PCP (Clinic vs [facility] vs [facility]):     Transportation:     Education:Phone call to patient regarding rescheduled mammogram. The patient did not answer voice mail left to return my call to reschedule.

## 2025-01-29 ENCOUNTER — HOSPITAL ENCOUNTER (OUTPATIENT)
Facility: HOSPITAL | Age: 50
Discharge: HOME | End: 2025-01-29
Payer: OTHER GOVERNMENT

## 2025-01-29 DIAGNOSIS — Z12.31 ENCOUNTER FOR SCREENING MAMMOGRAM FOR MALIGNANT NEOPLASM OF BREAST: ICD-10-CM

## 2025-01-29 PROCEDURE — 77063 BREAST TOMOSYNTHESIS BI: CPT

## 2025-01-29 PROCEDURE — 77067 SCR MAMMO BI INCL CAD: CPT

## 2025-01-30 ENCOUNTER — RESULTS FOLLOW-UP (OUTPATIENT)
Dept: OBGYN CLINIC | Facility: CLINIC | Age: 50
End: 2025-01-30

## 2025-01-31 NOTE — TELEPHONE ENCOUNTER
----- Message from OTILIA Santizo sent at 1/30/2025  8:13 AM EST -----  Please inform pt that her mammogram was negative, recommend next screening in 1 year.  Thank You!